# Patient Record
Sex: MALE | Race: WHITE | Employment: FULL TIME | URBAN - METROPOLITAN AREA
[De-identification: names, ages, dates, MRNs, and addresses within clinical notes are randomized per-mention and may not be internally consistent; named-entity substitution may affect disease eponyms.]

---

## 2017-03-14 ENCOUNTER — TELEPHONE (OUTPATIENT)
Dept: ALLERGY | Facility: CLINIC | Age: 22
End: 2017-03-14

## 2017-03-14 NOTE — TELEPHONE ENCOUNTER
Spoke with pt and told him due to the fact that he is 25years old that we need him to sign a consent so that we can discuss health issues with mother. He stated understanding and he also gave verbal permission to discuss his health record and care.   Pt ha

## 2017-05-17 ENCOUNTER — NURSE ONLY (OUTPATIENT)
Dept: ALLERGY | Facility: CLINIC | Age: 22
End: 2017-05-17

## 2017-05-17 ENCOUNTER — OFFICE VISIT (OUTPATIENT)
Dept: ALLERGY | Facility: CLINIC | Age: 22
End: 2017-05-17

## 2017-05-17 VITALS
HEIGHT: 72 IN | RESPIRATION RATE: 16 BRPM | BODY MASS INDEX: 23.7 KG/M2 | OXYGEN SATURATION: 98 % | DIASTOLIC BLOOD PRESSURE: 70 MMHG | SYSTOLIC BLOOD PRESSURE: 108 MMHG | WEIGHT: 175 LBS | TEMPERATURE: 98 F | HEART RATE: 80 BPM

## 2017-05-17 DIAGNOSIS — J30.89 ENVIRONMENTAL AND SEASONAL ALLERGIES: Primary | ICD-10-CM

## 2017-05-17 DIAGNOSIS — Z91.09 ENVIRONMENTAL ALLERGIES: ICD-10-CM

## 2017-05-17 DIAGNOSIS — J30.81 ALLERGIC RHINITIS DUE TO ANIMAL HAIR AND DANDER: Primary | ICD-10-CM

## 2017-05-17 PROCEDURE — 95004 PERQ TESTS W/ALRGNC XTRCS: CPT | Performed by: ALLERGY & IMMUNOLOGY

## 2017-05-17 PROCEDURE — 99204 OFFICE O/P NEW MOD 45 MIN: CPT | Performed by: ALLERGY & IMMUNOLOGY

## 2017-05-17 PROCEDURE — 99212 OFFICE O/P EST SF 10 MIN: CPT | Performed by: ALLERGY & IMMUNOLOGY

## 2017-05-17 RX ORDER — MOMETASONE 50 UG/1
2 SPRAY, METERED NASAL DAILY
Qty: 1 INHALER | Refills: 2 | Status: SHIPPED | OUTPATIENT
Start: 2017-05-17 | End: 2021-06-29

## 2017-05-17 RX ORDER — LEVOCETIRIZINE DIHYDROCHLORIDE 5 MG/1
5 TABLET, FILM COATED ORAL NIGHTLY
Qty: 30 TABLET | Refills: 2 | Status: SHIPPED | OUTPATIENT
Start: 2017-05-17

## 2017-05-17 NOTE — PROGRESS NOTES
Harvin Dandy is a 25year old male. HPI:   No chief complaint on file.     Patient is a 70-year-old male who presents for allergy evaluation with a chief complaint of allergies including concern for possible cat allergy      Allergies  Duration:4 yea Oral Tab Take 150 mcg by mouth before breakfast. Disp:  Rfl: 0   Adapalene-Benzoyl Peroxide (EPIDUO) 0.1-2.5 % Apply Externally Gel Apply 1 Application topically nightly.  Use as tolerated- may bleach clothing Disp: 45 g Rfl: 3   Clindamycin Phosphate (JOSSELIN Cardiovascular: regular rate and rhythm no murmurs, gallups, or rubs  Abdomen: soft non-tender non-distended  Skin/Hair: no unusual rashes present  Extremities: no edema, cyanosis, or clubbing  Neurological:Oriented to time, place, person & situation summer             Orders This Visit:  No orders of the defined types were placed in this encounter.        Meds This Visit:    No prescriptions requested or ordered in this encounter    Imaging & Referrals:  None     5/17/2017  Martin Pugh MD      If

## 2017-05-17 NOTE — PATIENT INSTRUCTIONS
Recs: Handouts on allergies and avoidance measures provided and reviewed including the potential treatment option of immunotherapy  Start Xyzal, levo cetirizine 5 mg p.o. Nightly in place of Zyrtec  Start Nasonex 2 sprays per nostril once a day.   Reviewed

## 2017-11-09 ENCOUNTER — MED REC SCAN ONLY (OUTPATIENT)
Dept: INTERNAL MEDICINE CLINIC | Facility: CLINIC | Age: 22
End: 2017-11-09

## 2018-11-27 ENCOUNTER — MED REC SCAN ONLY (OUTPATIENT)
Dept: PEDIATRICS CLINIC | Facility: CLINIC | Age: 23
End: 2018-11-27

## 2020-06-19 ENCOUNTER — LAB ENCOUNTER (OUTPATIENT)
Dept: LAB | Facility: HOSPITAL | Age: 25
End: 2020-06-19
Attending: INTERNAL MEDICINE
Payer: COMMERCIAL

## 2020-06-19 DIAGNOSIS — Z00.00 ROUTINE GENERAL MEDICAL EXAMINATION AT A HEALTH CARE FACILITY: Primary | ICD-10-CM

## 2020-06-19 PROCEDURE — 36415 COLL VENOUS BLD VENIPUNCTURE: CPT

## 2020-06-19 PROCEDURE — 80061 LIPID PANEL: CPT

## 2020-06-19 PROCEDURE — 81001 URINALYSIS AUTO W/SCOPE: CPT

## 2020-06-19 PROCEDURE — 80053 COMPREHEN METABOLIC PANEL: CPT

## 2020-06-19 PROCEDURE — 85025 COMPLETE CBC W/AUTO DIFF WBC: CPT

## 2020-06-19 PROCEDURE — 84443 ASSAY THYROID STIM HORMONE: CPT

## 2020-07-28 ENCOUNTER — HOSPITAL ENCOUNTER (EMERGENCY)
Facility: HOSPITAL | Age: 25
Discharge: HOME | End: 2020-07-28
Attending: EMERGENCY MEDICINE
Payer: COMMERCIAL

## 2020-07-28 VITALS
HEIGHT: 73 IN | OXYGEN SATURATION: 99 % | RESPIRATION RATE: 18 BRPM | DIASTOLIC BLOOD PRESSURE: 80 MMHG | HEART RATE: 74 BPM | WEIGHT: 160 LBS | TEMPERATURE: 98 F | SYSTOLIC BLOOD PRESSURE: 132 MMHG | BODY MASS INDEX: 21.2 KG/M2

## 2020-07-28 DIAGNOSIS — R00.2 PALPITATIONS: Primary | ICD-10-CM

## 2020-07-28 DIAGNOSIS — E87.6 HYPOKALEMIA: ICD-10-CM

## 2020-07-28 LAB
ANION GAP SERPL CALC-SCNC: 6 MMOL/L (ref 0–18)
BASOPHILS # BLD AUTO: 0.03 X10(3) UL (ref 0–0.2)
BASOPHILS NFR BLD AUTO: 0.4 %
BUN BLD-MCNC: 13 MG/DL (ref 7–18)
BUN/CREAT SERPL: 12.6 (ref 10–20)
CALCIUM BLD-MCNC: 8.6 MG/DL (ref 8.5–10.1)
CHLORIDE SERPL-SCNC: 106 MMOL/L (ref 98–112)
CO2 SERPL-SCNC: 26 MMOL/L (ref 21–32)
CREAT BLD-MCNC: 1.03 MG/DL (ref 0.7–1.3)
DEPRECATED RDW RBC AUTO: 42.1 FL (ref 35.1–46.3)
EOSINOPHIL # BLD AUTO: 0.16 X10(3) UL (ref 0–0.7)
EOSINOPHIL NFR BLD AUTO: 1.9 %
ERYTHROCYTE [DISTWIDTH] IN BLOOD BY AUTOMATED COUNT: 12.8 % (ref 11–15)
GLUCOSE BLD-MCNC: 121 MG/DL (ref 70–99)
HAV IGM SER QL: 2.1 MG/DL (ref 1.6–2.6)
HCT VFR BLD AUTO: 42.9 % (ref 39–53)
HGB BLD-MCNC: 14.7 G/DL (ref 13–17.5)
IMM GRANULOCYTES # BLD AUTO: 0.03 X10(3) UL (ref 0–1)
IMM GRANULOCYTES NFR BLD: 0.4 %
LYMPHOCYTES # BLD AUTO: 1.88 X10(3) UL (ref 1–4)
LYMPHOCYTES NFR BLD AUTO: 22.5 %
MCH RBC QN AUTO: 30.6 PG (ref 26–34)
MCHC RBC AUTO-ENTMCNC: 34.3 G/DL (ref 31–37)
MCV RBC AUTO: 89.2 FL (ref 80–100)
MONOCYTES # BLD AUTO: 0.62 X10(3) UL (ref 0.1–1)
MONOCYTES NFR BLD AUTO: 7.4 %
NEUTROPHILS # BLD AUTO: 5.63 X10 (3) UL (ref 1.5–7.7)
NEUTROPHILS # BLD AUTO: 5.63 X10(3) UL (ref 1.5–7.7)
NEUTROPHILS NFR BLD AUTO: 67.4 %
OSMOLALITY SERPL CALC.SUM OF ELEC: 287 MOSM/KG (ref 275–295)
PLATELET # BLD AUTO: 249 10(3)UL (ref 150–450)
POTASSIUM SERPL-SCNC: 3.2 MMOL/L (ref 3.5–5.1)
RBC # BLD AUTO: 4.81 X10(6)UL (ref 4.3–5.7)
SODIUM SERPL-SCNC: 138 MMOL/L (ref 136–145)
T3FREE SERPL-MCNC: 2.96 PG/ML (ref 2.4–4.2)
T4 FREE SERPL-MCNC: 1.4 NG/DL (ref 0.8–1.7)
TSI SER-ACNC: 0.02 MIU/ML (ref 0.36–3.74)
WBC # BLD AUTO: 8.4 X10(3) UL (ref 4–11)

## 2020-07-28 PROCEDURE — 84443 ASSAY THYROID STIM HORMONE: CPT | Performed by: EMERGENCY MEDICINE

## 2020-07-28 PROCEDURE — 96361 HYDRATE IV INFUSION ADD-ON: CPT

## 2020-07-28 PROCEDURE — 84481 FREE ASSAY (FT-3): CPT | Performed by: EMERGENCY MEDICINE

## 2020-07-28 PROCEDURE — 85025 COMPLETE CBC W/AUTO DIFF WBC: CPT | Performed by: EMERGENCY MEDICINE

## 2020-07-28 PROCEDURE — 83735 ASSAY OF MAGNESIUM: CPT | Performed by: EMERGENCY MEDICINE

## 2020-07-28 PROCEDURE — 96360 HYDRATION IV INFUSION INIT: CPT

## 2020-07-28 PROCEDURE — 93005 ELECTROCARDIOGRAM TRACING: CPT

## 2020-07-28 PROCEDURE — 84439 ASSAY OF FREE THYROXINE: CPT | Performed by: EMERGENCY MEDICINE

## 2020-07-28 PROCEDURE — 99284 EMERGENCY DEPT VISIT MOD MDM: CPT

## 2020-07-28 PROCEDURE — 93010 ELECTROCARDIOGRAM REPORT: CPT | Performed by: EMERGENCY MEDICINE

## 2020-07-28 PROCEDURE — 80048 BASIC METABOLIC PNL TOTAL CA: CPT | Performed by: EMERGENCY MEDICINE

## 2020-07-28 RX ORDER — POTASSIUM CHLORIDE 20 MEQ/1
40 TABLET, EXTENDED RELEASE ORAL ONCE
Status: COMPLETED | OUTPATIENT
Start: 2020-07-28 | End: 2020-07-28

## 2020-07-29 NOTE — ED INITIAL ASSESSMENT (HPI)
Patient presents \"feeling like his heart is racing. \" Patient states tingling in bilateral upper extremities as well.

## 2020-07-29 NOTE — ED PROVIDER NOTES
Patient Seen in: [facility] AND [facility] Emergency Department      History   Patient presents with:  Arrythmia/Palpitations    Stated Complaint:     HPI    77-year-old male presents for palpitations.   Patient reports that shortly after dinner he felt his heart ED Triage Vitals [07/28/20 2028]   /81   Pulse 106   Resp 16   Temp 98 °F (36.7 °C)   Temp src Oral   SpO2 99 %   O2 Device None (Room air)       Current:/80   Pulse 74   Temp 98 °F (36.7 °C) (Oral)   Resp 18   Ht 185.4 cm (6' 1\")   Wt 72. Glucose 121 (*)     Potassium 3.2 (*)     All other components within normal limits   TSH W REFLEX TO FREE T4 - Abnormal; Notable for the following components:    TSH 0.024 (*)     All other components within normal limits   MAGNESIUM - Normal   T4, FREE ( advised to follow up with PCP for reevaluation. Return precautions were given. Patient voices understanding and agreement with the treatment plan. All questions were addressed and answered.                         Disposition and Plan     Clinical Impressio

## 2020-07-31 NOTE — ED INITIAL ASSESSMENT (HPI)
Seen in the ED 3 days ago for same symptoms. States he is having trouble controlling his anxiety. Lack of appetite, upset stomach, not sleeping. Appears anxious with rapid speech and fidgets with mask. Started seeing a therapist about a month ago.  Tried us

## 2020-07-31 NOTE — ED PROVIDER NOTES
Patient Seen in: [facility]      History   Patient presents with: Anxiety    Stated Complaint: Anxiety    HPI    21 yo male with PMH as listed below here for evaluation of anxiety.   History is provided by patient and pt' Ear: External ear normal.      Nose: Nose normal.      Mouth/Throat:      Mouth: Mucous membranes are moist.   Eyes:      Extraocular Movements: Extraocular movements intact. Pupils: Pupils are equal, round, and reactive to light.    Neck:      Musculo daily as needed for Anxiety. , Print, Disp-14 tablet, R-0

## 2020-11-17 ENCOUNTER — LAB ENCOUNTER (OUTPATIENT)
Dept: LAB | Facility: HOSPITAL | Age: 25
End: 2020-11-17
Attending: INTERNAL MEDICINE
Payer: COMMERCIAL

## 2020-11-17 DIAGNOSIS — E89.0 POSTSURGICAL HYPOTHYROIDISM: Primary | ICD-10-CM

## 2020-11-17 PROCEDURE — 84443 ASSAY THYROID STIM HORMONE: CPT

## 2020-11-17 PROCEDURE — 36415 COLL VENOUS BLD VENIPUNCTURE: CPT

## 2020-11-17 PROCEDURE — 84439 ASSAY OF FREE THYROXINE: CPT

## 2021-02-12 ENCOUNTER — LAB ENCOUNTER (OUTPATIENT)
Dept: LAB | Facility: HOSPITAL | Age: 26
End: 2021-02-12
Attending: INTERNAL MEDICINE
Payer: COMMERCIAL

## 2021-02-12 DIAGNOSIS — E03.9 HYPOTHYROIDISM: Primary | ICD-10-CM

## 2021-02-12 DIAGNOSIS — E89.0 POSTSURGICAL HYPOTHYROIDISM: ICD-10-CM

## 2021-02-12 LAB
T4 FREE SERPL-MCNC: 1 NG/DL (ref 0.8–1.7)
TSI SER-ACNC: 1.07 MIU/ML (ref 0.36–3.74)

## 2021-02-12 PROCEDURE — 84443 ASSAY THYROID STIM HORMONE: CPT

## 2021-02-12 PROCEDURE — 36415 COLL VENOUS BLD VENIPUNCTURE: CPT

## 2021-02-12 PROCEDURE — 84439 ASSAY OF FREE THYROXINE: CPT

## 2021-06-29 ENCOUNTER — APPOINTMENT (OUTPATIENT)
Dept: MRI IMAGING | Facility: HOSPITAL | Age: 26
DRG: 684 | End: 2021-06-29
Attending: Other
Payer: COMMERCIAL

## 2021-06-29 ENCOUNTER — APPOINTMENT (OUTPATIENT)
Dept: CT IMAGING | Facility: HOSPITAL | Age: 26
DRG: 684 | End: 2021-06-29
Attending: EMERGENCY MEDICINE
Payer: COMMERCIAL

## 2021-06-29 ENCOUNTER — HOSPITAL ENCOUNTER (INPATIENT)
Facility: HOSPITAL | Age: 26
LOS: 1 days | Discharge: HOME | DRG: 684 | End: 2021-06-30
Attending: EMERGENCY MEDICINE | Admitting: EMERGENCY MEDICINE
Payer: COMMERCIAL

## 2021-06-29 DIAGNOSIS — R56.9 SEIZURE (HCC): Primary | ICD-10-CM

## 2021-06-29 PROBLEM — N17.9 AKI (ACUTE KIDNEY INJURY): Status: ACTIVE | Noted: 2021-06-29

## 2021-06-29 PROBLEM — N17.9 AKI (ACUTE KIDNEY INJURY) (HCC): Status: ACTIVE | Noted: 2021-06-29

## 2021-06-29 LAB
ALBUMIN SERPL-MCNC: 4.8 G/DL (ref 3.4–5)
ALP LIVER SERPL-CCNC: 64 U/L
ALT SERPL-CCNC: 34 U/L
ANION GAP SERPL CALC-SCNC: 25 MMOL/L (ref 0–18)
AST SERPL-CCNC: 25 U/L (ref 15–37)
BASOPHILS # BLD AUTO: 0.09 X10(3) UL (ref 0–0.2)
BASOPHILS NFR BLD AUTO: 0.9 %
BILIRUB DIRECT SERPL-MCNC: 0.2 MG/DL (ref 0–0.2)
BILIRUB SERPL-MCNC: 1 MG/DL (ref 0.1–2)
BUN BLD-MCNC: 13 MG/DL (ref 7–18)
BUN/CREAT SERPL: 9.6 (ref 10–20)
CALCIUM BLD-MCNC: 9.3 MG/DL (ref 8.5–10.1)
CHLORIDE SERPL-SCNC: 102 MMOL/L (ref 98–112)
CO2 SERPL-SCNC: 11 MMOL/L (ref 21–32)
CREAT BLD-MCNC: 1.36 MG/DL
DEPRECATED RDW RBC AUTO: 46.2 FL (ref 35.1–46.3)
EOSINOPHIL # BLD AUTO: 0.29 X10(3) UL (ref 0–0.7)
EOSINOPHIL NFR BLD AUTO: 3 %
ERYTHROCYTE [DISTWIDTH] IN BLOOD BY AUTOMATED COUNT: 12.8 % (ref 11–15)
GLUCOSE BLD-MCNC: 137 MG/DL (ref 70–99)
HAV IGM SER QL: 2.7 MG/DL (ref 1.6–2.6)
HCT VFR BLD AUTO: 51.1 %
HGB BLD-MCNC: 16.7 G/DL
IMM GRANULOCYTES # BLD AUTO: 0.07 X10(3) UL (ref 0–1)
IMM GRANULOCYTES NFR BLD: 0.7 %
LYMPHOCYTES # BLD AUTO: 3.99 X10(3) UL (ref 1–4)
LYMPHOCYTES NFR BLD AUTO: 41.4 %
M PROTEIN MFR SERPL ELPH: 8.9 G/DL (ref 6.4–8.2)
MCH RBC QN AUTO: 31.9 PG (ref 26–34)
MCHC RBC AUTO-ENTMCNC: 32.7 G/DL (ref 31–37)
MCV RBC AUTO: 97.5 FL
MONOCYTES # BLD AUTO: 0.93 X10(3) UL (ref 0.1–1)
MONOCYTES NFR BLD AUTO: 9.7 %
NEUTROPHILS # BLD AUTO: 4.26 X10 (3) UL (ref 1.5–7.7)
NEUTROPHILS # BLD AUTO: 4.26 X10(3) UL (ref 1.5–7.7)
NEUTROPHILS NFR BLD AUTO: 44.3 %
OSMOLALITY SERPL CALC.SUM OF ELEC: 288 MOSM/KG (ref 275–295)
PLATELET # BLD AUTO: 316 10(3)UL (ref 150–450)
POTASSIUM SERPL-SCNC: 3.5 MMOL/L (ref 3.5–5.1)
RBC # BLD AUTO: 5.24 X10(6)UL
SARS-COV-2 RNA RESP QL NAA+PROBE: NOT DETECTED
SODIUM SERPL-SCNC: 138 MMOL/L (ref 136–145)
T4 FREE SERPL-MCNC: 0.8 NG/DL (ref 0.8–1.7)
TSI SER-ACNC: 4.27 MIU/ML (ref 0.36–3.74)
VIT B12 SERPL-MCNC: 336 PG/ML (ref 193–986)
WBC # BLD AUTO: 9.6 X10(3) UL (ref 4–11)

## 2021-06-29 PROCEDURE — 99223 1ST HOSP IP/OBS HIGH 75: CPT | Performed by: INTERNAL MEDICINE

## 2021-06-29 PROCEDURE — 72125 CT NECK SPINE W/O DYE: CPT | Performed by: EMERGENCY MEDICINE

## 2021-06-29 PROCEDURE — 70450 CT HEAD/BRAIN W/O DYE: CPT | Performed by: EMERGENCY MEDICINE

## 2021-06-29 PROCEDURE — 99223 1ST HOSP IP/OBS HIGH 75: CPT | Performed by: OTHER

## 2021-06-29 PROCEDURE — 99254 IP/OBS CNSLTJ NEW/EST MOD 60: CPT | Performed by: PHYSICIAN ASSISTANT

## 2021-06-29 PROCEDURE — 70553 MRI BRAIN STEM W/O & W/DYE: CPT | Performed by: OTHER

## 2021-06-29 RX ORDER — ACETAMINOPHEN 325 MG/1
650 TABLET ORAL EVERY 4 HOURS PRN
Status: DISCONTINUED | OUTPATIENT
Start: 2021-06-29 | End: 2021-06-30

## 2021-06-29 RX ORDER — LORAZEPAM 2 MG/ML
2 INJECTION INTRAMUSCULAR ONCE
Status: COMPLETED | OUTPATIENT
Start: 2021-06-29 | End: 2021-06-29

## 2021-06-29 RX ORDER — ESCITALOPRAM OXALATE 10 MG/1
30 TABLET ORAL DAILY
Status: DISCONTINUED | OUTPATIENT
Start: 2021-06-29 | End: 2021-06-30

## 2021-06-29 RX ORDER — HYDROCODONE BITARTRATE AND ACETAMINOPHEN 5; 325 MG/1; MG/1
1 TABLET ORAL EVERY 4 HOURS PRN
Status: DISCONTINUED | OUTPATIENT
Start: 2021-06-29 | End: 2021-06-30

## 2021-06-29 RX ORDER — HYDROCODONE BITARTRATE AND ACETAMINOPHEN 5; 325 MG/1; MG/1
2 TABLET ORAL EVERY 4 HOURS PRN
Status: DISCONTINUED | OUTPATIENT
Start: 2021-06-29 | End: 2021-06-30

## 2021-06-29 RX ORDER — ALPRAZOLAM 0.25 MG/1
0.25 TABLET ORAL 2 TIMES DAILY PRN
Status: DISCONTINUED | OUTPATIENT
Start: 2021-06-29 | End: 2021-06-30

## 2021-06-29 RX ORDER — HEPARIN SODIUM 5000 [USP'U]/ML
5000 INJECTION, SOLUTION INTRAVENOUS; SUBCUTANEOUS EVERY 8 HOURS SCHEDULED
Status: DISCONTINUED | OUTPATIENT
Start: 2021-06-29 | End: 2021-06-30

## 2021-06-29 RX ORDER — SODIUM CHLORIDE, SODIUM LACTATE, POTASSIUM CHLORIDE, CALCIUM CHLORIDE 600; 310; 30; 20 MG/100ML; MG/100ML; MG/100ML; MG/100ML
INJECTION, SOLUTION INTRAVENOUS CONTINUOUS
Status: DISCONTINUED | OUTPATIENT
Start: 2021-06-29 | End: 2021-06-30

## 2021-06-29 RX ORDER — ONDANSETRON 2 MG/ML
4 INJECTION INTRAMUSCULAR; INTRAVENOUS EVERY 6 HOURS PRN
Status: DISCONTINUED | OUTPATIENT
Start: 2021-06-29 | End: 2021-06-30

## 2021-06-29 RX ORDER — METOCLOPRAMIDE HYDROCHLORIDE 5 MG/ML
10 INJECTION INTRAMUSCULAR; INTRAVENOUS EVERY 8 HOURS PRN
Status: DISCONTINUED | OUTPATIENT
Start: 2021-06-29 | End: 2021-06-30

## 2021-06-29 RX ORDER — LEVOTHYROXINE SODIUM 112 UG/1
112 TABLET ORAL
Status: DISCONTINUED | OUTPATIENT
Start: 2021-06-30 | End: 2021-06-30

## 2021-06-29 NOTE — ED PROVIDER NOTES
Patient Seen in: [facility] AND [facility] Emergency Department      History   Patient presents with:  Seizure Disorder    Stated Complaint: seizure    HPI/Subjective:   HPI    51-year-old male with history of thyroid cancer at age 12 presents for evaluation of O2 Device 06/29/21 1253 None (Room air)       Current:/80   Pulse 87   Temp 98.2 °F (36.8 °C) (Oral)   Resp 19   SpO2 97%         Physical Exam  Vitals and nursing note reviewed. Constitutional:       General: He is not in acute distress.      Marisel Total Protein 8.9 (*)     All other components within normal limits   T4, FREE (S) - Normal   CBC WITH DIFFERENTIAL WITH PLATELET    Narrative: The following orders were created for panel order CBC With Differential With Platelet.   Procedure inflammation superficial to the right     temporalis muscle suggesting bruising or shallow hematoma. No acute     calvarial fracture. SINUSES: Trace chronic appearing inflammation within the sphenoid and     maxillary sinuses. No fluid levels.   Ellie Saravia fracture or traumatic subluxation of the cervical spine. 2. Moderate dextroscoliosis of the cervical spine. 3. Mild enlargement of the adenoids as well as palatine/lingual tonsils. 4. Lesser incidental findings as above.               Remote - PS Shauna, no further recommendations for the ER.     Impression:  After review and interpretation of the above emergency department workup, the patient was found to have Seizure [facility])  (primary encounter diagnosis)    Plan: Well-appearing patient, initially t

## 2021-06-29 NOTE — H&P
[facility]  HISTORY AND PHYSICAL       Jonathan Niño Patient Status:  Emergency    1995  32year old [facility] [id number]   Location  Attending Yesy Garrison MD     PCP Maria A Mckeon MD         DATE OF ADMISSION: 21 aggravating factors. States occurs with alcohol, food, and liquid. Denies any blood in emesis. Episodes are usually self resolving. Patient is able to eat regularly in between episodes. Episodes typically happen sporadically every few days or so.   Kai Carrion GEL    Apply 1 Application topically nightly.    Modified Medications    No medications on file   Discontinued Medications    No medications on file       SOCIAL HISTORY  Social History    Socioeconomic History      Marital status: Single      Spouse name: present. MUSCULOSKELETAL:  No obvious abnormalities noted  EXTREMITIES: No edema appreciated  NEUROLOGICAL: AAOx3, motor and sensation grossly intact, no focal deficit appreciated.     SKIN:  Warm and well perfused  PSYCHIATRIC: Normal mood      IMAGING  C further recommendations      TIM   -Likely secondary to seizure activity  -Check CPK  - Starting IVF  - Avoiding Nephrotoxic agents  - Cont to monitor      Hypothyroidism  -Resume home levothyroxine         70 minutes spent on this admission - discussing w

## 2021-06-29 NOTE — CONSULTS
Consult dictated. Patient evaluated in the emergency room, by dad, sister. New onset generalized tonic-clonic seizure. Have ordered an MRI of the brain pre and postcontrast, EEG as soon as possible.   Further recommendation pending results of test.  Long

## 2021-06-29 NOTE — PLAN OF CARE
Patient came from ED, seizure precautions, CIWA protocol. MRI planned for this evening. Will continue to monitor.       Problem: Patient Centered Care  Goal: Patient preferences are identified and integrated in the patient's plan of care  Description: Inter

## 2021-06-29 NOTE — CONSULTS
[facility]   Gastroenterology Consultation Note    Yudith Falcon  Patient Status:    Emergency  Date of Admission:         6/29/2021, Hospital day #0  Attending:   Bj Hall MD  PCP:     Dariela Betancourt MD    Reason for Con occasionally with spicy foods; no BE in the family he is aware of     Surgical Hx:  - thyroidectomy  - no abdominal surgeries     Endoscopy Hx:  - none    Social Hx:  - no tobacco use  + recent increase ETOH 2-3 alcoholic beverages nightly   - Denies illic ALLERGIC/IMMUNOLOGIC:  negative for hay fever   ENDOCRINE:  negative for cold intolerance and heat intolerance  MUSCULOSKELETAL:  negative for joint effusion/severe erythema  NEURO: upper and lower extremity shaking lasting approximately 2 minutes   BEHA of the cervical spine. 2. Moderate dextroscoliosis of the cervical spine. 3. Mild enlargement of the adenoids as well as palatine/lingual tonsils. 4. Lesser incidental findings as above.    Remote - PS  Dictated by (CST): Jaci Qiu MD on 6/29/2021 at bedside. Thank you for the opportunity to participate in the care of this patient.     [id number] [facility]  6/29/2021

## 2021-06-30 VITALS
DIASTOLIC BLOOD PRESSURE: 63 MMHG | OXYGEN SATURATION: 95 % | WEIGHT: 138.88 LBS | TEMPERATURE: 99 F | RESPIRATION RATE: 18 BRPM | SYSTOLIC BLOOD PRESSURE: 117 MMHG | HEART RATE: 51 BPM | BODY MASS INDEX: 18 KG/M2

## 2021-06-30 LAB
ALBUMIN SERPL-MCNC: 4 G/DL (ref 3.4–5)
ALBUMIN/GLOB SERPL: 1.2 {RATIO} (ref 1–2)
ALP LIVER SERPL-CCNC: 49 U/L
ALT SERPL-CCNC: 29 U/L
ANION GAP SERPL CALC-SCNC: 7 MMOL/L (ref 0–18)
AST SERPL-CCNC: 37 U/L (ref 15–37)
BASOPHILS # BLD AUTO: 0.03 X10(3) UL (ref 0–0.2)
BASOPHILS NFR BLD AUTO: 0.4 %
BILIRUB SERPL-MCNC: 1.7 MG/DL (ref 0.1–2)
BUN BLD-MCNC: 8 MG/DL (ref 7–18)
BUN/CREAT SERPL: 7.8 (ref 10–20)
CALCIUM BLD-MCNC: 8.9 MG/DL (ref 8.5–10.1)
CHLORIDE SERPL-SCNC: 105 MMOL/L (ref 98–112)
CO2 SERPL-SCNC: 28 MMOL/L (ref 21–32)
CREAT BLD-MCNC: 1.03 MG/DL
DEPRECATED RDW RBC AUTO: 47.3 FL (ref 35.1–46.3)
EOSINOPHIL # BLD AUTO: 0.21 X10(3) UL (ref 0–0.7)
EOSINOPHIL NFR BLD AUTO: 2.6 %
ERYTHROCYTE [DISTWIDTH] IN BLOOD BY AUTOMATED COUNT: 13.3 % (ref 11–15)
GLOBULIN PLAS-MCNC: 3.4 G/DL (ref 2.8–4.4)
GLUCOSE BLD-MCNC: 95 MG/DL (ref 70–99)
HAV IGM SER QL: 2.6 MG/DL (ref 1.6–2.6)
HCT VFR BLD AUTO: 44.8 %
HGB BLD-MCNC: 14.8 G/DL
IMM GRANULOCYTES # BLD AUTO: 0.03 X10(3) UL (ref 0–1)
IMM GRANULOCYTES NFR BLD: 0.4 %
LYMPHOCYTES # BLD AUTO: 1.83 X10(3) UL (ref 1–4)
LYMPHOCYTES NFR BLD AUTO: 23 %
M PROTEIN MFR SERPL ELPH: 7.4 G/DL (ref 6.4–8.2)
MCH RBC QN AUTO: 31.6 PG (ref 26–34)
MCHC RBC AUTO-ENTMCNC: 33 G/DL (ref 31–37)
MCV RBC AUTO: 95.7 FL
MONOCYTES # BLD AUTO: 0.73 X10(3) UL (ref 0.1–1)
MONOCYTES NFR BLD AUTO: 9.2 %
NEUTROPHILS # BLD AUTO: 5.11 X10 (3) UL (ref 1.5–7.7)
NEUTROPHILS # BLD AUTO: 5.11 X10(3) UL (ref 1.5–7.7)
NEUTROPHILS NFR BLD AUTO: 64.4 %
OSMOLALITY SERPL CALC.SUM OF ELEC: 288 MOSM/KG (ref 275–295)
PLATELET # BLD AUTO: 234 10(3)UL (ref 150–450)
POTASSIUM SERPL-SCNC: 4.1 MMOL/L (ref 3.5–5.1)
RBC # BLD AUTO: 4.68 X10(6)UL
SODIUM SERPL-SCNC: 140 MMOL/L (ref 136–145)
WBC # BLD AUTO: 7.9 X10(3) UL (ref 4–11)

## 2021-06-30 PROCEDURE — 99232 SBSQ HOSP IP/OBS MODERATE 35: CPT | Performed by: OTHER

## 2021-06-30 PROCEDURE — 95816 EEG AWAKE AND DROWSY: CPT | Performed by: OTHER

## 2021-06-30 PROCEDURE — 99233 SBSQ HOSP IP/OBS HIGH 50: CPT | Performed by: HOSPITALIST

## 2021-06-30 PROCEDURE — 99233 SBSQ HOSP IP/OBS HIGH 50: CPT | Performed by: INTERNAL MEDICINE

## 2021-06-30 RX ORDER — PANTOPRAZOLE SODIUM 40 MG/1
40 TABLET, DELAYED RELEASE ORAL DAILY
Qty: 30 TABLET | Refills: 0 | Status: SHIPPED | OUTPATIENT
Start: 2021-06-30

## 2021-06-30 NOTE — PROGRESS NOTES
Pt was admitted to the hospital subsequent to falling on passing out suddenly , hitting his head on the floor. According to the pt there is no history in the family of seizure and this is the first occurrence he has encountered. Pt did acknowledge that the i

## 2021-06-30 NOTE — PLAN OF CARE
Dr Edinson Rob and Dr. Charanjit Brush have cleared the patient for discharge home. Dr. Charanjit Brush advised the patient to avoid driving for a minimum of 3 months. Mom and dad are at the bedside. Patient's parents will drive him home.

## 2021-06-30 NOTE — CONSULTS
[facility]    PATIENT'S NAME: Pegge Stager   ATTENDING PHYSICIAN: Freda Khanna MD   CONSULTING PHYSICIAN: Talisha Stuart MD   PATIENT ACCOUNT#:   [id number]    LOCATION:  [address] [place] [address] RECORD #:   [id number]       [facility] reflexes are symmetric, without Babinski signs. Joint position sense and vibration normal.  Finger-to-nose is normal.    LABORATORY DATA:  He had a CT of the head, cervical spine; reports reviewed. IMPRESSION:  New onset seizure.   I called the EEG tech

## 2021-06-30 NOTE — PROGRESS NOTES
Discharge RN Summary: Patient has discharge order in. Patient to discharge home. IV removed by RN He. Understands to follow up with PCP in 1 week. Patient understands to  meds from pharmacy.        Scripts sent with pt: none  Electronically se

## 2021-06-30 NOTE — PROGRESS NOTES
Awake–drowsy EEG is normal.  MRI has been completed several hours ago.   Still waiting for report from radiologist.

## 2021-06-30 NOTE — PROGRESS NOTES
[facility]     Gastroenterology Progress Note    Adine Gross Patient Status:  Inpatient    1995 MRN [id number]   Location [address] Attending Alea Menjivar, [address] Day # 1 PCP Micheline Gardner MD       Tally Pour few weeks later. This has been ongoing for a while and appears that he saw our group in clinic in 2014 for similar symptoms, at that point celiac serologies and inflammatory markers were negative. On this admission CBC and CMP are largely unremarkable. Gastroenterology      Results:     Lab Results   Component Value Date    WBC 7.9 06/30/2021    HGB 14.8 06/30/2021    HCT 44.8 06/30/2021    .0 06/30/2021    CREATSERUM 1.03 06/30/2021    BUN 8 06/30/2021     06/30/2021    K 4.1 06/30/2021 Sinus Tachycardia -RSR(V1).  -Left atrial enlargement.  BORDERLINE When compared with ECG of 07/28/2020 20:23:39 No significant changes have occurred Electronically signed on 06/29/2021 at 16:07 by Evens Lux MD

## 2021-06-30 NOTE — PROCEDURES
[address] [facility] [address]      PATIENT'S NAME: Roberto Reynolds   ATTENDING PHYSICIAN: Arin Lawler MD   PATIENT ACCOUNT #: [de-identified] LOCATION: [facility]   MEDICAL RECORD #: [id number] DATE OF BIR

## 2021-07-01 LAB — H PYLORI AG STL QL IA: NEGATIVE

## 2021-07-01 NOTE — PROGRESS NOTES
[facility]    Progress Note    Montana Zach Patient Status:  Inpatient    1995 MRN [id number]   Location [address] [facility] Attending No att. providers found   [facility] Day # 1 PCP Nicholas France MD       Subjective:   Deann Posadas We 2.6 06/30/2021    B12 336 06/29/2021       CT BRAIN OR HEAD (28356)    Result Date: 6/29/2021  CONCLUSION: There is localized bruising or a shallow hematoma over the right temporal scalp. No acute calvarial fracture. No acute intracranial process.     Dic

## 2021-07-02 LAB — TTG IGA SER-ACNC: 0.5 U/ML (ref ?–7)

## 2021-07-03 LAB — CALPROTECTIN STL-MCNT: 35.3 ΜG/G (ref ?–50)

## 2021-07-06 NOTE — DISCHARGE SUMMARY
[facility]    Discharge Summary    Bethel Rosales Patient Status:  Inpatient    1995 MRN [id number]   Location [address] [facility] Attending No att. providers found   Hosp Day # 1 PCP Luúl Spears MD     Date of Admission:  patient fell, hitting forehead on floor, and continued to have what was described as tonic-clonic activity. Father states son was unresponsive at that time. Episode reportedly lasted about 1 to 2 minutes and was self resolved.   After episode, patient rem bites  -Some possible concern for alcohol/benzodiazepine withdrawal induced seizure activity.   However patient denies any recent sudden cessation.   -seizure precautions  -Counseled on need for cessation of any nonprescription drugs  -Close monitoring  -Ne mouth 2 (two) times daily as needed for Anxiety. Quantity: 30 tablet  Refills: 1     Levocetirizine Dihydrochloride 5 MG Tabs  Commonly known as: Xyzal      Take 1 tablet (5 mg total) by mouth nightly.    Quantity: 30 tablet  Refills: 2     Levothyroxine

## 2021-09-13 ENCOUNTER — OFFICE VISIT (OUTPATIENT)
Dept: GASTROENTEROLOGY | Facility: CLINIC | Age: 26
End: 2021-09-13
Payer: COMMERCIAL

## 2021-09-13 VITALS
WEIGHT: 185.38 LBS | BODY MASS INDEX: 24.57 KG/M2 | HEIGHT: 73 IN | TEMPERATURE: 99 F | DIASTOLIC BLOOD PRESSURE: 81 MMHG | SYSTOLIC BLOOD PRESSURE: 133 MMHG | HEART RATE: 84 BPM

## 2021-09-13 DIAGNOSIS — R11.2 NAUSEA AND VOMITING IN ADULT: Primary | ICD-10-CM

## 2021-09-13 PROCEDURE — 3008F BODY MASS INDEX DOCD: CPT | Performed by: INTERNAL MEDICINE

## 2021-09-13 PROCEDURE — 3079F DIAST BP 80-89 MM HG: CPT | Performed by: INTERNAL MEDICINE

## 2021-09-13 PROCEDURE — 3075F SYST BP GE 130 - 139MM HG: CPT | Performed by: INTERNAL MEDICINE

## 2021-09-13 PROCEDURE — 99213 OFFICE O/P EST LOW 20 MIN: CPT | Performed by: INTERNAL MEDICINE

## 2021-09-13 RX ORDER — ONDANSETRON 4 MG/1
4 TABLET, ORALLY DISINTEGRATING ORAL EVERY 8 HOURS PRN
Qty: 30 TABLET | Refills: 1 | Status: SHIPPED | OUTPATIENT
Start: 2021-09-13 | End: 2022-01-14

## 2021-09-13 NOTE — PROGRESS NOTES
Subjective:   Patient ID: Brown Rodriguez is a 32year old male. HPI  Adolfo Harley returns in follow-up. He was last seen by myself in August 2014.   He was recently hospitalized in June 2021 for a seizure and had nausea and vomiting in the setting of alcoh does not currently use the cannabis although did so in the past.    He is considering reevaluation through [facility].       History/Other:   Review of Systems  Current Outpatient Medications   Medication Sig Dispense Refill   • ondansetron 4 MG Oral Tabl Tenderness: There is no abdominal tenderness. There is no guarding or rebound. Musculoskeletal:      Cervical back: Neck supple. Lymphadenopathy:      Cervical: No cervical adenopathy.    Neurological:      Mental Status: He is alert and oriented to per PHOSPHATASE      45 - 117 U/L 49 64   Total Bilirubin      0.1 - 2.0 mg/dL 1.7 1.0   TOTAL PROTEIN      6.4 - 8.2 g/dL 7.4 8.9 (H)   Albumin      3.4 - 5.0 g/dL 4.0 4.8   Globulin      2.8 - 4.4 g/dL 3.4    A/G Ratio      1.0 - 2.0 1.2    Bilirubin, Direct other significant visible lesion.     SINUSES: Limited views demonstrate trace mucosal thickening of the maxillary sinuses with minimal mucous retention cyst formation on the left.     ORBITS: Limited views are grossly unremarkable.         OTHER: There is

## 2021-09-14 NOTE — PATIENT INSTRUCTIONS
1.  Continue efforts to treat and control anxiety. 2.  Ondansetron ODT (Zofran) as needed for the nausea. 3.  Please contact me if your symptoms continue.

## 2021-11-10 ENCOUNTER — TELEPHONE (OUTPATIENT)
Dept: NEUROLOGY | Facility: CLINIC | Age: 26
End: 2021-11-10

## 2021-11-10 DIAGNOSIS — R56.9 SEIZURE (HCC): Primary | ICD-10-CM

## 2021-11-10 RX ORDER — LEVETIRACETAM 500 MG/1
TABLET ORAL
Qty: 180 TABLET | Refills: 3 | Status: SHIPPED | OUTPATIENT
Start: 2021-11-10 | End: 2022-01-10

## 2021-11-11 NOTE — TELEPHONE ENCOUNTER
I spoke with Bakari's dad Payton Issa, who stated he came home alone 1 PM today somewhat disoriented and then took a nap. Upon arising from a nap he noticed that he had bitten his tongue and was complaining of low back pain.   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

## 2021-11-19 ENCOUNTER — LAB ENCOUNTER (OUTPATIENT)
Dept: LAB | Age: 26
End: 2021-11-19
Attending: Other
Payer: COMMERCIAL

## 2021-11-19 DIAGNOSIS — R56.9 SEIZURE (HCC): ICD-10-CM

## 2021-11-19 PROCEDURE — 80177 DRUG SCRN QUAN LEVETIRACETAM: CPT

## 2021-11-19 PROCEDURE — 36415 COLL VENOUS BLD VENIPUNCTURE: CPT

## 2021-11-22 ENCOUNTER — TELEPHONE (OUTPATIENT)
Dept: NEUROLOGY | Facility: CLINIC | Age: 26
End: 2021-11-22

## 2021-11-22 DIAGNOSIS — R56.9 SEIZURE (HCC): Primary | ICD-10-CM

## 2021-11-22 NOTE — TELEPHONE ENCOUNTER
Please call the patient and tell him Keppra level is low. If he is presently on 500 mg p.o. twice daily. Tell him to increase to 1000 mg p.o. twice daily. Tell him to check a trough Keppra level in 1 week.

## 2021-11-22 NOTE — TELEPHONE ENCOUNTER
Spoke to patient and let him know below information, he states he did not take his keppra the morning of his blood draw and if that has anything to do with the low level.      Pt states that he has been feeling some side effects from the keppra but he feels

## 2021-11-22 NOTE — TELEPHONE ENCOUNTER
He can tell him that it would not significantly affect the level. If he is significant more side effects from the increased dose and they persist, tell him to call office and we will switch him to normal medication.

## 2021-11-23 NOTE — TELEPHONE ENCOUNTER
I spoke with Criss Muñoz and originally was interested in trying another anticonvulsant medication because of some issues with depression. No suicidal ideations or thoughts.   I did relate that if we switch him to another medication he would need to be on 2 me

## 2021-11-29 ENCOUNTER — LAB ENCOUNTER (OUTPATIENT)
Dept: LAB | Age: 26
End: 2021-11-29
Attending: Other
Payer: COMMERCIAL

## 2021-11-29 DIAGNOSIS — R56.9 SEIZURE (HCC): ICD-10-CM

## 2021-11-29 PROCEDURE — 80177 DRUG SCRN QUAN LEVETIRACETAM: CPT

## 2021-11-29 PROCEDURE — 36415 COLL VENOUS BLD VENIPUNCTURE: CPT

## 2021-12-01 ENCOUNTER — TELEPHONE (OUTPATIENT)
Dept: NEUROLOGY | Facility: CLINIC | Age: 26
End: 2021-12-01

## 2021-12-02 NOTE — TELEPHONE ENCOUNTER
Spoke to pt who reports he was feeling well until he doubled the dose of Keppra on 11/22/21. Since doubling the dose, pt  has been experiencing \"bouts of insomnia, extreme anxiety, & detachment. \"  Pt is currently taking Keppra 1000 mg po BID

## 2021-12-07 ENCOUNTER — TELEPHONE (OUTPATIENT)
Dept: NEUROLOGY | Facility: CLINIC | Age: 26
End: 2021-12-07

## 2021-12-07 DIAGNOSIS — R56.9 SEIZURE (HCC): Primary | ICD-10-CM

## 2021-12-07 NOTE — TELEPHONE ENCOUNTER
Spoke with [employer] He is having side effects from [place]. Keppra 500mg, 2 p.o. twice daily. Will decrease to 1 in the morning 2 at night. We will check a blood level in 1 week.

## 2021-12-15 ENCOUNTER — LAB ENCOUNTER (OUTPATIENT)
Dept: LAB | Age: 26
End: 2021-12-15
Attending: Other
Payer: COMMERCIAL

## 2021-12-15 DIAGNOSIS — R56.9 SEIZURE (HCC): ICD-10-CM

## 2021-12-15 PROCEDURE — 36415 COLL VENOUS BLD VENIPUNCTURE: CPT

## 2021-12-15 PROCEDURE — 80177 DRUG SCRN QUAN LEVETIRACETAM: CPT

## 2021-12-21 ENCOUNTER — TELEPHONE (OUTPATIENT)
Dept: NEUROLOGY | Facility: CLINIC | Age: 26
End: 2021-12-21

## 2021-12-21 ENCOUNTER — TELEMEDICINE (OUTPATIENT)
Dept: NEUROLOGY | Facility: CLINIC | Age: 26
End: 2021-12-21
Payer: COMMERCIAL

## 2021-12-21 DIAGNOSIS — R56.9 SEIZURE (HCC): Primary | ICD-10-CM

## 2021-12-21 PROCEDURE — G2252 BRIEF CHKIN BY MD/QHP, 11-20: HCPCS | Performed by: OTHER

## 2021-12-21 NOTE — TELEPHONE ENCOUNTER
Please call the patient tell him Keppra levels right in the middle range of therapeutic. Please ask him how he is feeling on the [address].

## 2021-12-21 NOTE — PROGRESS NOTES
Virtual/Telephone Check-In    Angela Davila verbally consents to a Virtual/Telephone Check-In service on 12/21/21. Patient understands and accepts financial responsibility for any deductible, co-insurance and/or co-pays associated with this service.

## 2021-12-21 NOTE — TELEPHONE ENCOUNTER
Spoke with pt about below notes. He state he feel ok. Pt state he still have days when he have side effect;severe anxiety and depression. He is now having some insomnia as well. Pt has an appt to day and plan to further discuss.

## 2022-01-07 ENCOUNTER — PATIENT MESSAGE (OUTPATIENT)
Dept: NEUROLOGY | Facility: CLINIC | Age: 27
End: 2022-01-07

## 2022-01-07 DIAGNOSIS — R56.9 SEIZURE (HCC): Primary | ICD-10-CM

## 2022-01-10 RX ORDER — LEVETIRACETAM 500 MG/1
TABLET ORAL
Qty: 270 TABLET | Refills: 3 | Status: SHIPPED | OUTPATIENT
Start: 2022-01-10

## 2022-01-10 RX ORDER — LEVETIRACETAM 500 MG/1
TABLET ORAL
Qty: 270 TABLET | Refills: 3 | Status: CANCELLED | OUTPATIENT
Start: 2022-01-10

## 2022-01-10 NOTE — TELEPHONE ENCOUNTER
From: Roger Downing  To: Gilford Quince, MD, MD  Sent: 1/7/2022 3:36 PM CST  Subject: Elvira Alanis,    I have 9 pills of Keppra left and attempted to get a refill as I am currently taking 3 a day.  [facility] says they are unable to fill t

## 2022-01-10 NOTE — TELEPHONE ENCOUNTER
Patient called to say that [facility] is saying   Prescription can not be filled until 1/17 but he will not have enough to last till then. Are we able to do anything to get it refilled sooner?

## 2022-01-10 NOTE — TELEPHONE ENCOUNTER
Refill request for keppra 500 mg, take 1 tab in morning and 2 tabs in evening, #270, 3 refills    LOV: 12/21/21  NOV: none

## 2022-01-12 ENCOUNTER — TELEPHONE (OUTPATIENT)
Dept: NEUROLOGY | Facility: CLINIC | Age: 27
End: 2022-01-12

## 2022-01-14 RX ORDER — ONDANSETRON 4 MG/1
4 TABLET, ORALLY DISINTEGRATING ORAL EVERY 8 HOURS PRN
Qty: 30 TABLET | Refills: 1 | Status: SHIPPED | OUTPATIENT
Start: 2022-01-14

## 2022-01-14 NOTE — TELEPHONE ENCOUNTER
Dr. Reese Guzman--    Patient requesting refill for ondansetron 4 MG. Please review and sign pended orders if appropriate.      LOV: 09/13/2021   LR: 09/13/2021   Future Appts: None     Medication Quantity Refills Start End   ondansetron 4 MG Oral Tabl

## 2022-01-31 ENCOUNTER — LAB ENCOUNTER (OUTPATIENT)
Dept: LAB | Age: 27
End: 2022-01-31
Attending: INTERNAL MEDICINE
Payer: COMMERCIAL

## 2022-01-31 DIAGNOSIS — C73 MALIGNANT NEOPLASM OF THYROID GLAND (HCC): Primary | ICD-10-CM

## 2022-01-31 DIAGNOSIS — E89.0 POST-SURGICAL HYPOTHYROIDISM: ICD-10-CM

## 2022-01-31 LAB
T4 FREE SERPL-MCNC: 0.7 NG/DL (ref 0.8–1.7)
TSI SER-ACNC: 13.6 MIU/ML (ref 0.36–3.74)

## 2022-01-31 PROCEDURE — 86800 THYROGLOBULIN ANTIBODY: CPT

## 2022-01-31 PROCEDURE — 36415 COLL VENOUS BLD VENIPUNCTURE: CPT

## 2022-01-31 PROCEDURE — 84443 ASSAY THYROID STIM HORMONE: CPT

## 2022-01-31 PROCEDURE — 84432 ASSAY OF THYROGLOBULIN: CPT

## 2022-01-31 PROCEDURE — 84439 ASSAY OF FREE THYROXINE: CPT

## 2022-02-02 LAB
THYROGLOBULIN AB: <0.9 IU/ML
THYROGLOBULIN, SERUM OR PLASMA: 0.3 NG/ML

## 2022-03-23 ENCOUNTER — TELEPHONE (OUTPATIENT)
Dept: PHYSICAL MEDICINE AND REHAB | Facility: CLINIC | Age: 27
End: 2022-03-23

## 2022-03-23 NOTE — TELEPHONE ENCOUNTER
Please call the patient tell him I am not aware of any articles referencing interaction of melatonin and seizure medicine. Sleep deprivation may trigger seizures. Amount of melatonin he is taking is a very large dose which I would not recommend and asked him to discuss this with his primary care doctor.

## 2022-03-23 NOTE — TELEPHONE ENCOUNTER
Spoke with pt that state he is taking 20 mg of Melatonin at night to help with sleep. He read a article that stated taking Melatonin along with anti-seizure medication can have a negative effect. Pt would like clarity if he should be taking medication together.

## 2022-03-23 NOTE — TELEPHONE ENCOUNTER
Pt has a question regarding -levETIRAcetam (KEPPRA) 500 MG Oral Tab- . Pt had read a article regarding an  medication  he takes that can have negative effect if pt is taking Keppra .   Please advise

## 2022-03-24 RX ORDER — ONDANSETRON 4 MG/1
4 TABLET, ORALLY DISINTEGRATING ORAL EVERY 8 HOURS PRN
Qty: 30 TABLET | Refills: 1 | Status: SHIPPED | OUTPATIENT
Start: 2022-03-24

## 2022-04-12 ENCOUNTER — PATIENT MESSAGE (OUTPATIENT)
Dept: NEUROLOGY | Facility: CLINIC | Age: 27
End: 2022-04-12

## 2022-04-12 NOTE — TELEPHONE ENCOUNTER
Please advise. Pt taking keppra 500mg 1 tablet in the morning and 2 tablets in the evening- no missed doses.

## 2022-04-12 NOTE — TELEPHONE ENCOUNTER
From: Torito Sandoval  To: Bekah Torres MD, MD  Sent: 4/12/2022 11:54 AM CDT  Subject: Neural Misfire    Hi Dr Rambo Watson,  I had another neural misfire last night in front of my dad. I was bent over petting my sister's new cat and got up when my dad claimed I looked like I was seeing stars and asked if I was okay. It wasn't until a half second later that I got a major head rush and felt extremely lightheaded to the point where I needed to sit down. Was shaking pretty substantially due to anxiety. Thank God I didn't seize or pass out, but I'm pretty shaken up about it. Do you have any thoughts or advice? My dad seems to think it was just a byproduct of the blood rushing to my head while petting the cat and getting up too quickly. I just get worried since all these neurological events happen out of nowhere. Please let me know if you want to discuss further or have any ideas. As mentioned, my dad was there too if you need him to answer any questions.    Thanks,   [employer]

## 2022-04-18 ENCOUNTER — PATIENT MESSAGE (OUTPATIENT)
Dept: GASTROENTEROLOGY | Facility: CLINIC | Age: 27
End: 2022-04-18

## 2022-04-18 NOTE — TELEPHONE ENCOUNTER
From: Guerrero Nurse  To: Nabil Wang MD  Sent: 4/18/2022 3:34 PM CDT  Subject: Xanax Refill    Hi Dr. Bushra Faye,  I am traveling to [facility] this Thursday and am taking an inventory on my medications before traveling. I am running quite low on Xanax and all that is left in the bottle is all but dust. I was wondering if we could schedule an appointment before then or you could write me a new script. I haven't been out of country since [place] and am quite anxious pre trip so it'd be nice to know I have the emergency pills just in case. Please let me know what the best course of action is.   Thanks,  [employer]

## 2022-04-27 PROBLEM — F41.9 ANXIETY DISORDER, UNSPECIFIED: Status: ACTIVE | Noted: 2022-04-27

## 2022-05-05 ENCOUNTER — LAB ENCOUNTER (OUTPATIENT)
Dept: LAB | Age: 27
End: 2022-05-05
Attending: Other
Payer: COMMERCIAL

## 2022-05-05 DIAGNOSIS — R56.9 SEIZURE (HCC): ICD-10-CM

## 2022-05-05 PROCEDURE — 36415 COLL VENOUS BLD VENIPUNCTURE: CPT

## 2022-05-05 PROCEDURE — 80177 DRUG SCRN QUAN LEVETIRACETAM: CPT

## 2022-05-07 LAB — LEVETIRACETAM (KEPPRA): 12 UG/ML

## 2022-05-09 ENCOUNTER — TELEPHONE (OUTPATIENT)
Dept: NEUROLOGY | Facility: CLINIC | Age: 27
End: 2022-05-09

## 2022-05-09 NOTE — TELEPHONE ENCOUNTER
Call patient and ask him how he is feeling. Keppra level was therapeutic. Ask him if he has had any seizures. Let me know.

## 2022-06-28 NOTE — TELEPHONE ENCOUNTER
Please call the patient and ask him how he is feeling on the [address]. His Keppra level is now towards the high side of normal.  Please tell him I am still out of the office.   If he is having side effects please let me know and also have them tell you how m Td (Adult), 5 Lf Tetanus Toxoid, Pf (Tenivac, Decavac) 11/20/2021       Active Problems:  Patient Active Problem List   Diagnosis Code    Other and unspecified angina pectoris I20.9    Coronary atherosclerosis of native coronary artery I25.10    Other chronic pulmonary heart diseases I27.89    Mitral valve disorder I05.9    Acute combined systolic and diastolic heart failure ([facility]) I50.41    Chronic low back pain M54.50, G89.29    Essential hypertension I10    AGUILA (acute kidney injury) ([facility] N17.9    Renal insufficiency N28.9    Gout M10.9    Pain in joint, hand M25.549    Mixed hyperlipidemia E78.2    Primary insomnia F51.01    Closed stable burst fracture of first lumbar vertebra ([facility]) S32.011A    GERD (gastroesophageal reflux disease) K21.9    Palpitations R00.2    V-tach ([facility]) I47.2    Abnormal myocardial perfusion study R94.39    Ischemic cardiomyopathy I25.5    Cardiomyopathy ([facility] [facility] I42.9    PVD (posterior vitreous detachment), both eyes H43.813    Posterior subcapsular polar age-related cataract of both eyes H25.043    Confusion R41.0    Chronic kidney disease, stage IV (severe) ([facility]) N18.4    Visual field defect H53.40    Stroke of unknown etiology ([facility] [address] I63.9    Encounter for loop recorder check Z45.09    Atrial fibrillation ([facility]) I48.91    CHF (congestive heart failure), NYHA class I, acute on chronic, combined ([facility]) I50.43    On amiodarone therapy Z79.899    On continuous oral anticoagulation Z79.01       Isolation/Infection:   Isolation            No Isolation          Patient Infection Status       Infection Onset Added Last Indicated Last Indicated By Review Planned Expiration Resolved Resolved By    None active    Resolved    COVID-19 (Rule Out) 05/29/22 05/29/22 05/29/22 COVID-19, Rapid (Ordered)   05/29/22 Rule-Out Test Resulted    COVID-19 (Rule Out) 03/18/22 03/18/22 03/18/22 COVID-19 & Influenza Combo (Ordered)   03/18/22 Rule-Out Test Resulted    COVID-19 11/25/20 11/28/20 11/25/20 COVID-19 20             Nurse Assessment:  Last Vital Signs: BP (!) 148/87   Pulse 74   Temp 97.2 °F (36.2 °C) (Oral)   Resp 18   Ht 5' (1.524 m)   Wt 127 lb 13.9 oz (58 kg)   SpO2 93%   BMI 24.97 kg/m²     Last documented pain score (0-10 scale): Pain Level: 6  Last Weight:   Wt Readings from Last 1 Encounters:   22 127 lb 13.9 oz (58 kg)     Mental Status:  {IP PT MENTAL STATUS:}    IV Access:  { ANNEMARIE IV ACCESS:370308334}    Nursing Mobility/ADLs:  Walking   {CHP DME ZDGF:950353397}  Transfer  {CHP DME EKYO:164300064}  Bathing  {CHP DME PNSE:127690952}  Dressing  {CHP DME XAYY:598457319}  Toileting  {CHP DME CLAL:760569223}  Feeding  {CHP DME ZMEY:848077222}  Med Admin  {P DME VIIF:045008917}  Med Delivery   { ANNEMARIE MED Delivery:602252390}    Wound Care Documentation and Therapy:        Elimination:  Continence: Bowel: {YES / XQ:15569}  Bladder: {YES / O}  Urinary Catheter: {Urinary Catheter:971968184}   Colostomy/Ileostomy/Ileal Conduit: {YES / GE:52697}       Date of Last BM: ***    Intake/Output Summary (Last 24 hours) at 2022 1042  Last data filed at 2022 0719  Gross per 24 hour   Intake 694.82 ml   Output 4700 ml   Net -4005.18 ml     I/O last 3 completed shifts: In: 1482.2 [P.O.:720;  I.V.:762.2]  Out: 6975 [RTGLI:1880]    Safety Concerns:     [employer] Safety Concerns:846165084}    Impairments/Disabilities:      [employer] Impairments/Disabilities:891682441}    Nutrition Therapy:  Current Nutrition Therapy:   [employer] Diet List:016997439}    Routes of Feeding: {P DME Other Feedings:723846132}  Liquids: {Slp liquid thickness:71748}  Daily Fluid Restriction: {CHP DME Yes amt example:848303192}  Last Modified Barium Swallow with Video (Video Swallowing Test): {Done Not Done BNSO:543960452}    Treatments at the Time of Hospital Discharge:   Respiratory Treatments: ***  Oxygen Therapy:  {Therapy; copd oxygen:96757}  Ventilator:    { CC Vent REVI:343014045}      Heart Failure Instructions for Daily Management  Patient was treated for acute on chronic combined systolic and diastolic heart failure. she  will require the following:    Please weigh daily on the same scale and approximately the same time of day. Report weight gain of 3 pounds/day or 5 pounds/week to : Jono Pena [phone]. Please use hospital discharge weight as baseline reference. Please monitor for signs and symptoms of and report to MD:  Worsening Heart Failure: sudden weight gain, shortness of breath, lower extremity or general edema/swelling, abdominal bloating/swelling, inability to lie flat, intolerance to usual activity, or cough (especially at night). Report these finding even if no increase in weight. Dehydration:  having difficulty or a decrease in urination, dizziness, worsening fatigue, or new onset/worsening of generalized weakness. Please continue a LOW SODIUM diet and LIMIT fluid intake to 48 - 64 ounces ( 1.5 - 2 liters) per day. Call Jono Pena [phone] with any questions or concerns. Please continue heart failure education to patient and family/support system. See After Visit Summary for hospital follow up appointment details. Consider spiritual care referral for support and/or completion of advance directives . Consider: [place] [address] telehealth program if patient agreeable and able to participate. Patient's primary cardiologist is Dr Gustavo Restrepo  Please draw BMP on 7/3 or  and fax results to [facility] so they can be reviewed with patient at cardiology follow up.       Rehab Therapies: Physical Therapy, Occupational Therapy, and SN, MSW  Weight Bearing Status/Restrictions: [address]  Weight Bearin}  Other Medical Equipment (for information only, NOT a DME order):  {EQUIPMENT:195991863}  Other Treatments: ***    Patient's personal belongings (please select all that are sent with patient):  {[facility] DME Belongings:754698626}    RN SIGNATURE:  {Esignature:563023404}    CASE MANAGEMENT/SOCIAL WORK SECTION    Inpatient Status Date: ***    Readmission Risk Assessment Score:  Readmission Risk              Risk of Unplanned Readmission:  22           Discharging to Facility/ Agency   Name:   Address:  Phone:  Fax:    Dialysis Facility (if applicable)   Name:  Address:  Dialysis Schedule:  Phone:  Fax:    / signature: {Esignature:809220557}    PHYSICIAN SECTION    Prognosis: {Prognosis:4397207737}    Condition at Discharge: [address] [place] [address] Patient Condition:953675768}    Rehab Potential (if transferring to Rehab): {Prognosis:6272957376}    Recommended Labs or Other Treatments After Discharge: ***    Physician Certification: I certify the above information and transfer of Angela Cook  is necessary for the continuing treatment of the diagnosis listed and that she requires {Admit to Appropriate Level of Care:22649} for {GREATER/LESS:178677546} 30 days.      Update Admission H&P: {CHP DME Changes in RXDEI:089677880}    PHYSICIAN SIGNATURE:  {Esignature:030072499}

## 2022-07-11 RX ORDER — ONDANSETRON 4 MG/1
TABLET, ORALLY DISINTEGRATING ORAL
Qty: 30 TABLET | Refills: 1 | Status: SHIPPED | OUTPATIENT
Start: 2022-07-11

## 2022-07-11 RX ORDER — ONDANSETRON 4 MG/1
4 TABLET, ORALLY DISINTEGRATING ORAL EVERY 8 HOURS PRN
Qty: 30 TABLET | Refills: 1 | Status: SHIPPED | OUTPATIENT
Start: 2022-07-11

## 2022-08-08 ENCOUNTER — PATIENT MESSAGE (OUTPATIENT)
Dept: GASTROENTEROLOGY | Facility: CLINIC | Age: 27
End: 2022-08-08

## 2022-08-09 NOTE — TELEPHONE ENCOUNTER
From: Iris Spatz  To: Kelechi Caballero MD  Sent: 8/8/2022 3:02 PM CDT  Subject: Wright Begin Dr. Reese Guzman,  I had seen you a few months ago for nausea and throwing up, and unfortunately I've had those symptoms return recently. It appears you are booked through November with appointments, but I was wondering if we could at least schedule a virtual visit to discuss possible next treatment steps. Please let me know.   Thanks,  Hussain Putnam

## 2022-08-11 NOTE — TELEPHONE ENCOUNTER
Pt scheduled as below:    Monday August 15, 2022  4:30 PM  Follow Up Visit with Skyla Her MD  [facility], [address] [place] [address] ([address] [place] [address]) [address] [phone]

## 2022-08-15 ENCOUNTER — OFFICE VISIT (OUTPATIENT)
Dept: GASTROENTEROLOGY | Facility: CLINIC | Age: 27
End: 2022-08-15
Payer: COMMERCIAL

## 2022-08-15 VITALS
HEIGHT: 73 IN | BODY MASS INDEX: 25.08 KG/M2 | DIASTOLIC BLOOD PRESSURE: 95 MMHG | WEIGHT: 189.19 LBS | HEART RATE: 85 BPM | SYSTOLIC BLOOD PRESSURE: 144 MMHG

## 2022-08-15 DIAGNOSIS — R11.2 NAUSEA AND VOMITING, UNSPECIFIED VOMITING TYPE: Primary | ICD-10-CM

## 2022-08-15 PROCEDURE — 3008F BODY MASS INDEX DOCD: CPT | Performed by: INTERNAL MEDICINE

## 2022-08-15 PROCEDURE — 99212 OFFICE O/P EST SF 10 MIN: CPT | Performed by: INTERNAL MEDICINE

## 2022-08-15 PROCEDURE — 3077F SYST BP >= 140 MM HG: CPT | Performed by: INTERNAL MEDICINE

## 2022-08-15 PROCEDURE — 3080F DIAST BP >= 90 MM HG: CPT | Performed by: INTERNAL MEDICINE

## 2022-08-16 NOTE — PATIENT INSTRUCTIONS
1.  Continue to treat anxiety. 2.  Try taking the ondansetron at bedtime which may be more helpful. 3.  Please contact me if your symptoms continue and you wish to schedule an upper endoscopy.

## 2022-09-13 RX ORDER — ONDANSETRON 4 MG/1
4 TABLET, ORALLY DISINTEGRATING ORAL EVERY 8 HOURS PRN
Qty: 30 TABLET | Refills: 3 | Status: SHIPPED | OUTPATIENT
Start: 2022-09-13

## 2022-09-21 RX ORDER — ONDANSETRON 4 MG/1
TABLET, ORALLY DISINTEGRATING ORAL
Qty: 30 TABLET | Refills: 1 | OUTPATIENT
Start: 2022-09-21

## 2022-11-01 RX ORDER — ONDANSETRON 4 MG/1
4 TABLET, ORALLY DISINTEGRATING ORAL EVERY 8 HOURS PRN
Qty: 30 TABLET | Refills: 3 | Status: SHIPPED | OUTPATIENT
Start: 2022-11-01

## 2022-11-01 NOTE — TELEPHONE ENCOUNTER
LOV: 8/15/2022  NOV: None  Last refill: 7/11/2022      Dr. Alexy Nolan -please review and sign pended order if agreeable.

## 2022-11-26 RX ORDER — ONDANSETRON 4 MG/1
4 TABLET, ORALLY DISINTEGRATING ORAL EVERY 8 HOURS PRN
Qty: 30 TABLET | Refills: 3 | Status: SHIPPED | OUTPATIENT
Start: 2022-11-26

## 2023-01-09 RX ORDER — ONDANSETRON 4 MG/1
4 TABLET, ORALLY DISINTEGRATING ORAL EVERY 8 HOURS PRN
Qty: 30 TABLET | Refills: 3 | Status: SHIPPED | OUTPATIENT
Start: 2023-01-09

## 2023-03-15 ENCOUNTER — TELEPHONE (OUTPATIENT)
Dept: NEUROLOGY | Facility: CLINIC | Age: 28
End: 2023-03-15

## 2023-03-15 NOTE — TELEPHONE ENCOUNTER
Received refill request for LEVETIRACETAM 500MG TABLETS, TAKE 1 TABLET BY MOUTH EVERY MORNING AND 2 TABLET BY MOUTH IN THE EVENING from [facility] #[id number]. Former [facility] pt.  Needs to establish with new provider

## 2023-03-27 ENCOUNTER — TELEPHONE (OUTPATIENT)
Dept: NEUROLOGY | Facility: CLINIC | Age: 28
End: 2023-03-27

## 2023-03-27 NOTE — TELEPHONE ENCOUNTER
Please contact he patient to schedule an OV with one of the neurologist so that the medication request can be sent to the provider. Thanks,  Reviewed and electronically signed by:  CRISELDA Mejia       Order Details    Medication NDC Prescription # KIMBERLY   LEVETIRACETAM 500MG TABLETS [id number] No   Written Date Last Fill Date Quantity Days Supply   3/26/2023 3/26/2023 180 tablet 90   Sig Notes   TAKE 1 TABLET BY MOUTH TWICE DAILY Not Available     Provider Information    Prescribing Provider License EMMIE #   Yoselyn Grills

## 2023-06-12 ENCOUNTER — PATIENT MESSAGE (OUTPATIENT)
Dept: ENDOCRINOLOGY CLINIC | Facility: CLINIC | Age: 28
End: 2023-06-12

## 2023-06-12 ENCOUNTER — OFFICE VISIT (OUTPATIENT)
Dept: ENDOCRINOLOGY CLINIC | Facility: CLINIC | Age: 28
End: 2023-06-12

## 2023-06-12 VITALS — WEIGHT: 206 LBS | BODY MASS INDEX: 27.3 KG/M2 | HEIGHT: 73 IN

## 2023-06-12 DIAGNOSIS — C73 FOLLICULAR THYROID CANCER (HCC): Primary | ICD-10-CM

## 2023-06-12 PROCEDURE — 99244 OFF/OP CNSLTJ NEW/EST MOD 40: CPT | Performed by: INTERNAL MEDICINE

## 2023-06-12 PROCEDURE — 3008F BODY MASS INDEX DOCD: CPT | Performed by: INTERNAL MEDICINE

## 2023-06-13 PROBLEM — C73 FOLLICULAR THYROID CANCER (HCC): Status: ACTIVE | Noted: 2023-06-13

## 2023-06-13 NOTE — TELEPHONE ENCOUNTER
From: Kofi Westbrook  To: Eugenia Fischer MD  Sent: 6/12/2023 11:32 AM CDT  Subject: Latest full lab test from 4/8/23    As discussed with the nurse here, attached are my most recent blood test results including T4 and TSH levels.

## 2023-06-16 NOTE — TELEPHONE ENCOUNTER
Hi!    Please let him know that I have noted the lab test results. Once I see his current test results, I will make a decision about how much to prescribe him. Thank you!

## 2023-07-10 ENCOUNTER — LAB ENCOUNTER (OUTPATIENT)
Dept: LAB | Age: 28
End: 2023-07-10
Attending: INTERNAL MEDICINE
Payer: COMMERCIAL

## 2023-07-10 ENCOUNTER — HOSPITAL ENCOUNTER (OUTPATIENT)
Dept: ULTRASOUND IMAGING | Age: 28
Discharge: HOME | End: 2023-07-10
Attending: INTERNAL MEDICINE
Payer: COMMERCIAL

## 2023-07-10 DIAGNOSIS — C73 FOLLICULAR THYROID CANCER (HCC): ICD-10-CM

## 2023-07-10 PROCEDURE — 84443 ASSAY THYROID STIM HORMONE: CPT

## 2023-07-10 PROCEDURE — 76536 US EXAM OF HEAD AND NECK: CPT | Performed by: INTERNAL MEDICINE

## 2023-07-10 PROCEDURE — 86800 THYROGLOBULIN ANTIBODY: CPT

## 2023-07-10 PROCEDURE — 36415 COLL VENOUS BLD VENIPUNCTURE: CPT

## 2023-07-10 PROCEDURE — 84439 ASSAY OF FREE THYROXINE: CPT

## 2023-07-11 LAB
T4 FREE SERPL-MCNC: 1.2 NG/DL (ref 0.8–1.7)
TSI SER-ACNC: 1.08 MIU/ML (ref 0.36–3.74)

## 2023-07-12 LAB
THYROGLOB AB: <1 IU/ML
THYROGLOB IMA: <0.1 NG/ML

## 2023-07-19 ENCOUNTER — PATIENT MESSAGE (OUTPATIENT)
Dept: ENDOCRINOLOGY CLINIC | Facility: CLINIC | Age: 28
End: 2023-07-19

## 2023-07-21 NOTE — TELEPHONE ENCOUNTER
From: Charla Ha  To: Eugenia Brush MD  Sent: 7/19/2023 12:34 PM CDT  Subject: Refill Levothyroxine Prescription    Hi Dr. Kathy Brush,  Based on your message and my results coming back in an acceptable range, might you be able to prescribe an adjusted prescription for my Levothyroxine so that I can take just 1 pill a day instead of 1.5? From taking 1.5 112ug pills up to a single 168ug pill by my math?

## 2023-07-21 NOTE — TELEPHONE ENCOUNTER
Dr. Kay Siu, see patient message. Asking if there is a dose where he can take 1 full tab instead of cutting in half. See lab results.      Component      Latest Ref Rng 7/10/2023   T4,Free (Direct)      0.8 - 1.7 ng/dL 1.2    TSH      0.358 - 3.740 mIU/mL 1.080    Thyroglob Ab      0.0 - 0.9 IU/mL <1.0    Thyroglob GHULAM      1.4 - 29.2 ng/mL <0.1 (L) Pt will successfully establish breastfeeding by feeding in response to early feeding cues   or wake every 3h, will obtain deep latch, and will keep log of feedings/output. Taught to BF at hunger cues and or q 2-3 hrs and to offer 10-20 drops of hand expressed colostrum at any non-feeds. Breast Assessment  Left Breast: Medium  Left Nipple: Everted, Intact (piercing)  Right Breast: Medium  Right Nipple: Everted, Intact (piercing)  Breast- Feeding Assessment  Attends Breast-Feeding Classes: No  Breast-Feeding Experience: No  Breast Trauma/Surgery: Yes (Bilateral nipple piercings done 2 yrs. ago. Had rings removed 1 yr. ago)  Type/Quality: Good (Mother states baby has been latching on and breastfeeding well. When baby is sleepy she has been expressing drops.)  Lactation Consultant Visits  Breast-Feedings: Good  (Baby latched on vigorously to right breast during [facility] visit)  Mother/Infant Observation  Mother Observation: Alignment, Holds breast, Lets baby end feeding, Nipple round on release, Recognizes feeding cues, Sleepy after feeding  Infant Observation: Latches nipple and aereolae, Rhythmic suck, Relaxed after feeding, Opens mouth, Lips flanged, upper, Lips flanged, lower, Audible swallows  LATCH Documentation  Latch: Grasps breast, tongue down, lips flanged, rhythmic sucking  Audible Swallowing: Spontaneous and intermittent (24 hours old)  Type of Nipple: Everted (after stimulation)  Comfort (Breast/Nipple): Soft/non-tender  Hold (Positioning): No assist from staff, mother able to position/hold infant  LATCH Score: 10  Chart shows numerous feedings, void, stool WNL. Discussed importance of monitoring outputs and feedings on first week of life. Discussed ways to tell if baby is  getting enough breast milk, ie  voids and stools, change in color of stool, and return to birth wt within 2 weeks.   Follow up with pediatrician visit for weight check in 1-2 days (per AAP guidelines.)  Encouraged to call Warm Line [phone] or [facility] at [phone] for any questions/problems that arise. Mother also given breastfeeding support group dates and times for any future needsAnticipatory guidance given. Questions answered. Discussed signs of baby's allergy, excema. Discussed engorgement management, when breast are soft and flat you are making more milk than when hard and engorged. If you should have to take a medication and MD says can't breast feed contact lactation office. Breast feed if you or the baby gets sick to pass along natural antibiotics. Started Mom pumping since baby has a drop in weight. Mom states\" I am going to the store today to get a breast pump. \"  Instructed Mom to pump and manually express for 10 to 15 minutes post feed

## 2023-07-24 NOTE — TELEPHONE ENCOUNTER
Hi!  Please ask him if he thinks that he could take 1 tablet 6 days a week and 2 tablets on the 7th of a 150mcg dose? And if he is taking levothyroxine or Synthroid. Then I will prescribe it this way and we can check labs in 3 months. Thank you!

## 2023-08-07 NOTE — TELEPHONE ENCOUNTER
Dr. Glaser [facility], patient agreeable to 1 tablet 6 days a week and 2 tablets on the 7th of a 150mcg dose. Rx pended.

## 2023-08-09 DIAGNOSIS — R56.9 SEIZURE (HCC): ICD-10-CM

## 2023-08-09 NOTE — TELEPHONE ENCOUNTER
Refill request:  levETIRAcetam (KEPPRA) 500 MG Oral Tab     Former Dr. Vanessa Torres Patient; has September 19th appointment   with Dr. Giulia Novak

## 2023-08-09 NOTE — TELEPHONE ENCOUNTER
The patient is requesting a refill on: Keppra 500mg - Take one tablet by mouth in the morning & two tablets by mouth in the evening     Last OV: 12/21/21  Next OV: 9/27/23  Last refilled: 1/10/22 #270 with 3 refill

## 2023-08-10 RX ORDER — LEVETIRACETAM 500 MG/1
TABLET ORAL
Qty: 270 TABLET | Refills: 0 | Status: SHIPPED | OUTPATIENT
Start: 2023-08-10

## 2023-08-15 NOTE — TELEPHONE ENCOUNTER
Dr. Eneida Vogel, see patient message. He is taking generic levothyroxine and is willing to take 1 tablet 6 days per week and 2 tablets 1 day per week. Rx pended.

## 2023-08-17 RX ORDER — LEVOTHYROXINE SODIUM 0.15 MG/1
TABLET ORAL
Qty: 120 TABLET | Refills: 0 | Status: SHIPPED | OUTPATIENT
Start: 2023-08-17

## 2023-08-22 ENCOUNTER — OFFICE VISIT (OUTPATIENT)
Dept: NEUROLOGY | Facility: CLINIC | Age: 28
End: 2023-08-22
Payer: COMMERCIAL

## 2023-08-22 VITALS — HEIGHT: 73 IN | WEIGHT: 206 LBS | BODY MASS INDEX: 27.3 KG/M2

## 2023-08-22 DIAGNOSIS — R56.9 SEIZURE (HCC): Primary | ICD-10-CM

## 2023-08-22 PROCEDURE — 99214 OFFICE O/P EST MOD 30 MIN: CPT | Performed by: OTHER

## 2023-08-22 PROCEDURE — 3008F BODY MASS INDEX DOCD: CPT | Performed by: OTHER

## 2023-08-22 RX ORDER — ERGOCALCIFEROL 1.25 MG/1
50000 CAPSULE ORAL WEEKLY
COMMUNITY
Start: 2023-07-18

## 2023-08-22 RX ORDER — LEVETIRACETAM 500 MG/1
TABLET ORAL
Qty: 270 TABLET | Refills: 3 | Status: SHIPPED | OUTPATIENT
Start: 2023-08-22

## 2023-08-22 RX ORDER — HYDROXYZINE PAMOATE 25 MG/1
CAPSULE ORAL
COMMUNITY
Start: 2023-04-10

## 2023-08-22 NOTE — PROGRESS NOTES
Neurology Follow up Visit     Referred By: Dr. Tamez ref. provider found    Chief Complaint: Patient presents with:  Seizure Disorder: Former patient of Dr. Saintclair Shanks, here to re-establish care. States his last episode of seizures was in March, was off Keppra 10 days prior. Takes Keppra 500 mg 1 tablet in the morning and 2 tablets at night. HPI:     Guerrero Rosen is a 29year old male, who presents for history of epilepsy. Patient has had at least 3 episodes of seizures as reported in first visit in August 2023. We will happen while he was off of medication. First 1 had happened in 2021, MRI of the brain and EEG were not revealing at that time, apparently he had underwent October 2021 and after that he was started with Keppra. There are some messages in the chart about him being concerned for interaction of melatonin and Keppra. Also concerns of depression. However an appointment with me in August 2023 he was reporting that his depression was likely related to the lack of treatment of thyroid problems to the [employer] [address]. He was still interested in continuing with Keppra medication. The third seizure happened when he was very depressed and apparently he was lapsing and taking his medication and had another tonic-clonic seizure.   He does feel clouding in general..     Past Medical History:   Diagnosis Date    Blurry vision 2013    Congenital scoliosis     Esophageal reflux     Fracture, finger     fx left 4th finger - surgical repair    Hypothyroidism     Thyroid removed d/t CA 2011    Myopia with astigmatism 2013    OU    Seizure disorder [facility])     throid cancer age 12     Thyroid carcinoma ([facility] [address]     thyroidectomy    Thyroid carcinoma ([facility] [address] 2011       Past Surgical History:   Procedure Laterality Date    HAND/FINGER SURGERY UNLISTED      THYROIDECTOMY  2011       Social history:    Smoking status:   Never      Smokeless tobacco:   Current       Alcohol use   Yes      Comment:   Last drink 8/01          Drug use:      7 times per week      Types:   Cannabis, Amphetamines      Comment:   Vape about 5 times per week (\"4 puffs per night\"). History of Adderall use. Family History   Problem Relation Age of Onset    Glaucoma Father     Lipids Father         hyperlipidemia    Melanoma Father     Other (hyperlipidemia) Father     Hypertension Mother     Ovarian Cancer Maternal Grandmother     Other (alzheimer's disease) Paternal Grandmother     Other (Myocardial infarction) Paternal Grandfather     Depression Sister     Lung Disorder Sister         exercise induced bronchitis    Depression Brother     Anxiety Brother          Current Outpatient Medications:     ergocalciferol 1.25 MG (74710 UT) Oral Cap, Take 1 capsule (50,000 Units total) by mouth once a week., Disp: , Rfl:     hydrOXYzine Pamoate 25 MG Oral Cap, TAKE 1 TO 2 CAPSULES BY MOUTH FOUR TIMES DAILY UNTIL FUTHER NOTICE AS NEEDED, Disp: , Rfl:     levETIRAcetam (KEPPRA) 500 MG Oral Tab, Take one tablet by mouth in the morning & two tablets by mouth in the evening, Disp: 270 tablet, Rfl: 3    levothyroxine 150 MCG Oral Tab, Take 1 tablet 6 days per week and 2 tablets 1 day per week., Disp: 120 tablet, Rfl: 0    acamprosate 333 MG Oral Tab EC, Take 1 tablet (333 mg total) by mouth 3 (three) times daily. , Disp: 90 tablet, Rfl: 0    vilazodone 20 MG Oral Tab, Take 1 tablet (20 mg total) by mouth daily. , Disp: 30 tablet, Rfl: 0    Cholecalciferol (VITAMIN D3) 1.25 MG (01323 UT) Oral Cap, Take 50,000 Int'l Units/day by mouth once a week., Disp: , Rfl:     gabapentin 300 MG Oral Cap, Take 1 capsule (300 mg total) by mouth 3 (three) times daily as needed (Take 1-2 capsules three times daily as needed). , Disp: , Rfl:     hydrOXYzine 25 MG Oral Tab, Take 1 tablet (25 mg total) by mouth 4 (four) times daily as needed for Anxiety.  Bottle states: \"take 1-2 capsules by mouth four times daily as needed\", Disp: , Rfl:     thiamine 100 MG Oral Tab, Take 1 tablet (100 mg total) by mouth daily. (Patient not taking: Reported on 8/8/2023), Disp: , Rfl:     rosuvastatin 20 MG Oral Tab, Take 1 tablet (20 mg total) by mouth nightly., Disp: , Rfl:     ALPRAZolam 0.25 MG Oral Tab, Take 1 tablet (0.25 mg total) by mouth 2 (two) times daily as needed for Anxiety. (Patient not taking: Reported on 5/9/2023), Disp: 30 tablet, Rfl: 2    Levothyroxine Sodium 112 MCG Oral Tab, Take 1 tablet (112 mcg total) by mouth daily. Bottle states: \"take 1.5 tabs daily\", Disp: , Rfl:     Levocetirizine Dihydrochloride (XYZAL) 5 MG Oral Tab, Take 1 tablet (5 mg total) by mouth nightly., Disp: 30 tablet, Rfl: 2      Amoxicillin             HIVES, RASH    Comment:Other reaction(s): hives  Silver                  UNKNOWN  Adhesive Tape           RASH    Comment:Other reaction(s): Rash  Other                   RASH    Comment:tegaderm  Tegaderm Hydrogel W*    RASH    ROS:   As in HPI, the rest of the 14 system review was done and was negative      Physical Exam:   08/22/23  0831   Weight: 206 lb (93.4 kg)   Height: 73\"       General: No apparent distress, well nourished, well groomed. Head- Normocephalic, atraumatic  Eyes- No redness or swelling  ENT- Hearing intake, normal glutition  Neck- No masses or adenopathy  Cv: pulses were palpable and normal, no cyanosis or edema     Neurological:     Mental Status- Alert and oriented x3. Normal attention span and concentration  Thought process intact  Memory intact- recent and remote  Mood intact  Fund of knowledge appropriate for education and age    Language intact including: comprehension, naming, repetition, vocabulary    Cranial Nerves:    VII. Face symmetric, no facial weakness  VIII. Hearing intact to whisper. IX. Pallet elevates symmetrically. XI. Shoulder shrug is intact  XII.  Tongue is midline    Motor Exam:  Muscle tone normal  No atrophy or fasciculations  Strength- upper extremities 5/5 proximally and distally                  Rapid alternating movements intact    Gait:  Normal posture  Normal physiologic      Labs:    Lab Results   Component Value Date    TSH 1.080 07/10/2023     Lab Results   Component Value Date    HDL 65 (H) 06/19/2020     (H) 06/19/2020    TRIG 91 06/19/2020     Lab Results   Component Value Date    HGB 14.8 06/30/2021    HCT 44.8 06/30/2021    MCV 95.7 06/30/2021    WBC 7.9 06/30/2021    .0 06/30/2021      Lab Results   Component Value Date    BUN 8 06/30/2021    CA 8.9 06/30/2021    ALT 29 06/30/2021    AST 37 06/30/2021    ALKPHOS 68 06/04/2015    ALB 4.0 06/30/2021     06/30/2021    K 4.1 06/30/2021     06/30/2021    CO2 28.0 06/30/2021      I have reviewed labs. Imaging Studies:  I have independently reviewed imaging. MRI of the brain was independent reviewed, no obvious epileptogenic focus      Assessment   1. Seizure ([address]  Idiopathic epilepsy, at least 3 seizures. However while on medication it seems to be well controlled. We discussed potential side effects of Keppra with mood abnormalities and especially depression. Still patient decided to continue with Clarene Apley since he felt a his depression was related to the thyroid disease rather than Keppra itself. Follow-up will be checked again.    - COMP METABOLIC PANEL (14)  - CBC WITH DIFFERENTIAL WITH PLATELET  - LEVETIRACETAM, S; Future  - levETIRAcetam (KEPPRA) 500 MG Oral Tab; Take one tablet by mouth in the morning & two tablets by mouth in the evening  Dispense: 270 tablet; Refill: 3  - CT BRAIN OR HEAD (50512); Future  - MRI BRAIN (W+WO) (CPT=70553); Future           Education and counseling provided to patient. Instructed patient to call my office or seek medical attention immediately if symptoms worsen. Patient verbalized understanding of information given. All questions were answered. All side effects of drugs were discussed. Return to clinic in: Return in about 1 year (around 8/22/2024).     Cleavon Dubin, MD

## 2023-08-23 ENCOUNTER — LAB ENCOUNTER (OUTPATIENT)
Dept: LAB | Age: 28
End: 2023-08-23
Attending: Other
Payer: COMMERCIAL

## 2023-08-23 DIAGNOSIS — R56.9 SEIZURE (HCC): ICD-10-CM

## 2023-08-23 LAB
ALBUMIN SERPL-MCNC: 4.6 G/DL (ref 3.4–5)
ALBUMIN/GLOB SERPL: 1.2 {RATIO} (ref 1–2)
ALP LIVER SERPL-CCNC: 86 U/L
ALT SERPL-CCNC: 53 U/L
ANION GAP SERPL CALC-SCNC: 10 MMOL/L (ref 0–18)
AST SERPL-CCNC: 32 U/L (ref 15–37)
BASOPHILS # BLD AUTO: 0.07 X10(3) UL (ref 0–0.2)
BASOPHILS NFR BLD AUTO: 0.9 %
BILIRUB SERPL-MCNC: 1.3 MG/DL (ref 0.1–2)
BUN BLD-MCNC: 8 MG/DL (ref 7–18)
BUN/CREAT SERPL: 6.5 (ref 10–20)
CALCIUM BLD-MCNC: 9.9 MG/DL (ref 8.5–10.1)
CHLORIDE SERPL-SCNC: 105 MMOL/L (ref 98–112)
CO2 SERPL-SCNC: 24 MMOL/L (ref 21–32)
CREAT BLD-MCNC: 1.24 MG/DL
DEPRECATED RDW RBC AUTO: 43.8 FL (ref 35.1–46.3)
EGFRCR SERPLBLD CKD-EPI 2021: 81 ML/MIN/1.73M2 (ref 60–?)
EOSINOPHIL # BLD AUTO: 0.14 X10(3) UL (ref 0–0.7)
EOSINOPHIL NFR BLD AUTO: 1.8 %
ERYTHROCYTE [DISTWIDTH] IN BLOOD BY AUTOMATED COUNT: 13.7 % (ref 11–15)
FASTING STATUS PATIENT QL REPORTED: YES
GLOBULIN PLAS-MCNC: 3.7 G/DL (ref 2.8–4.4)
GLUCOSE BLD-MCNC: 111 MG/DL (ref 70–99)
HCT VFR BLD AUTO: 50.2 %
HGB BLD-MCNC: 16.9 G/DL
IMM GRANULOCYTES # BLD AUTO: 0.02 X10(3) UL (ref 0–1)
IMM GRANULOCYTES NFR BLD: 0.3 %
LYMPHOCYTES # BLD AUTO: 1.84 X10(3) UL (ref 1–4)
LYMPHOCYTES NFR BLD AUTO: 23.7 %
MCH RBC QN AUTO: 29.5 PG (ref 26–34)
MCHC RBC AUTO-ENTMCNC: 33.7 G/DL (ref 31–37)
MCV RBC AUTO: 87.8 FL
MONOCYTES # BLD AUTO: 0.56 X10(3) UL (ref 0.1–1)
MONOCYTES NFR BLD AUTO: 7.2 %
NEUTROPHILS # BLD AUTO: 5.12 X10 (3) UL (ref 1.5–7.7)
NEUTROPHILS # BLD AUTO: 5.12 X10(3) UL (ref 1.5–7.7)
NEUTROPHILS NFR BLD AUTO: 66.1 %
OSMOLALITY SERPL CALC.SUM OF ELEC: 287 MOSM/KG (ref 275–295)
PLATELET # BLD AUTO: 377 10(3)UL (ref 150–450)
POTASSIUM SERPL-SCNC: 3.9 MMOL/L (ref 3.5–5.1)
PROT SERPL-MCNC: 8.3 G/DL (ref 6.4–8.2)
RBC # BLD AUTO: 5.72 X10(6)UL
SODIUM SERPL-SCNC: 139 MMOL/L (ref 136–145)
WBC # BLD AUTO: 7.8 X10(3) UL (ref 4–11)

## 2023-08-23 PROCEDURE — 80053 COMPREHEN METABOLIC PANEL: CPT | Performed by: OTHER

## 2023-08-23 PROCEDURE — 80177 DRUG SCRN QUAN LEVETIRACETAM: CPT | Performed by: OTHER

## 2023-08-23 PROCEDURE — 85025 COMPLETE CBC W/AUTO DIFF WBC: CPT | Performed by: OTHER

## 2023-08-24 ENCOUNTER — TELEPHONE (OUTPATIENT)
Dept: NEUROLOGY | Facility: CLINIC | Age: 28
End: 2023-08-24

## 2023-08-24 NOTE — TELEPHONE ENCOUNTER
----- Message from Jeana Arriaza MD sent at 8/24/2023  8:02 AM CDT -----  Please let the patient know that results of these particular lab tests so far were normal.    Thank you

## 2023-08-25 ENCOUNTER — TELEPHONE (OUTPATIENT)
Dept: NEUROLOGY | Facility: CLINIC | Age: 28
End: 2023-08-25

## 2023-08-25 LAB — LEVETIRACETAM LVL: 22.7 UG/ML

## 2023-08-25 NOTE — TELEPHONE ENCOUNTER
----- Message from Maida Morales MD sent at 8/25/2023 10:53 AM CDT -----  Please let the patient know that results of these particular lab tests so far were normal.    Thank you

## 2023-09-28 DIAGNOSIS — R56.9 SEIZURE (HCC): ICD-10-CM

## 2023-09-28 RX ORDER — LEVETIRACETAM 500 MG/1
TABLET ORAL
Qty: 270 TABLET | Refills: 3 | OUTPATIENT
Start: 2023-09-28

## 2023-09-28 NOTE — TELEPHONE ENCOUNTER
Requested Prescriptions     Pending Prescriptions Disp Refills    levETIRAcetam (KEPPRA) 500 MG Oral Tab 270 tablet 3     Sig: Take one tablet by mouth in the morning & two tablets by mouth in the evening        LOV: 8/22/23  NOV: none    Last refill/[place]: 8/22/23    Denied-1 year supply sent to [facility] on 8/22/23

## 2023-11-12 DIAGNOSIS — R56.9 SEIZURE (HCC): ICD-10-CM

## 2023-11-13 RX ORDER — LEVETIRACETAM 500 MG/1
TABLET ORAL
Qty: 270 TABLET | Refills: 3 | Status: SHIPPED | OUTPATIENT
Start: 2023-11-13

## 2023-11-16 ENCOUNTER — HOSPITAL ENCOUNTER (OUTPATIENT)
Dept: CT IMAGING | Facility: HOSPITAL | Age: 28
Discharge: HOME | End: 2023-11-16
Attending: Other
Payer: COMMERCIAL

## 2023-11-16 DIAGNOSIS — R56.9 SEIZURE (HCC): ICD-10-CM

## 2023-11-16 PROCEDURE — 70450 CT HEAD/BRAIN W/O DYE: CPT | Performed by: OTHER

## 2023-11-17 ENCOUNTER — TELEPHONE (OUTPATIENT)
Dept: NEUROLOGY | Facility: CLINIC | Age: 28
End: 2023-11-17

## 2023-11-17 NOTE — TELEPHONE ENCOUNTER
----- Message from Carlie Lea MD sent at 11/17/2023  8:22 AM CST -----  Please let patient know that CT brain didn't show significant abnormalities.

## 2024-03-13 NOTE — TELEPHONE ENCOUNTER
LOV- 6/12/23    RTC- 3-6 months    F/U- no appointment scheduled. Sent pt a [employer] message to remind him to schedule a follow up.     Last refill- 8/17/23

## 2024-03-14 RX ORDER — LEVOTHYROXINE SODIUM 0.15 MG/1
TABLET ORAL
Qty: 120 TABLET | Refills: 0 | Status: SHIPPED | OUTPATIENT
Start: 2024-03-14 | End: 2024-06-13

## 2024-06-13 ENCOUNTER — OFFICE VISIT (OUTPATIENT)
Dept: ENDOCRINOLOGY CLINIC | Facility: CLINIC | Age: 29
End: 2024-06-13

## 2024-06-13 VITALS
BODY MASS INDEX: 24.83 KG/M2 | SYSTOLIC BLOOD PRESSURE: 116 MMHG | HEIGHT: 73 IN | WEIGHT: 187.38 LBS | HEART RATE: 81 BPM | DIASTOLIC BLOOD PRESSURE: 88 MMHG

## 2024-06-13 DIAGNOSIS — E89.0 POSTOPERATIVE HYPOTHYROIDISM: Primary | ICD-10-CM

## 2024-06-13 DIAGNOSIS — R53.83 FATIGUE, UNSPECIFIED TYPE: ICD-10-CM

## 2024-06-13 PROCEDURE — 3008F BODY MASS INDEX DOCD: CPT | Performed by: INTERNAL MEDICINE

## 2024-06-13 PROCEDURE — 99214 OFFICE O/P EST MOD 30 MIN: CPT | Performed by: INTERNAL MEDICINE

## 2024-06-13 PROCEDURE — 3079F DIAST BP 80-89 MM HG: CPT | Performed by: INTERNAL MEDICINE

## 2024-06-13 PROCEDURE — 3074F SYST BP LT 130 MM HG: CPT | Performed by: INTERNAL MEDICINE

## 2024-06-13 NOTE — PROGRESS NOTES
Name: Bakari Avina  Date: 6/13/24    Referring Physician: No ref. provider found    Chief Complaint   Patient presents with    Hypothyroidism     Follow up, last labs done 5/2024       HISTORY OF PRESENT ILLNESS   Bakari Avina is a 29 year old male who presents for   Chief Complaint   Patient presents with    Hypothyroidism     Follow up, last labs done 5/2024     This is a 29 year-old man here for follow-up of thyroid cancer. He was diagnosed when he was 16, in 2011. He had surgery and then LI. This was at [facility].     Thyroid nodule detected on routine examination for short stature in 3/11. FNA showed follicular neoplasm. Surgery in 6/11 showed 1.7x1.4x1.4 nodule right lobe. Completion thyroidectomy 7/11.     10/11 123I scan:    Three foci of radioiodine localization as described. The largest of these is in the neck slightly to the right of midline with 24 hour radioiodine uptake of 0.73%. This may represent residual thyroid cancer or residual normal thyroid.    In May of 2014, he had a suspicious level 2 node on the right on an US exam, but the FNA was negative.    He was currently taking 224mcg 4 days a week- 128mcg PO qday since May 15th; Prior to this, for a month, he was taking 168mcg.     At the last visit, I had increased his dose to 171mcg and asked him to have repeat labs and he has come back for follow up.    Medical History:   Past Medical History:    Blurry vision    Congenital scoliosis    Esophageal reflux    Fracture, finger    fx left 4th finger - surgical repair    Hypothyroidism    Thyroid removed d/t CA 2011    Myopia with astigmatism    OU    Seizure disorder (HCC)    throid cancer age 16    Thyroid carcinoma (HCC)    thyroidectomy    Thyroid carcinoma (HCC)       Surgical History:   Past Surgical History:   Procedure Laterality Date    Hand/finger surgery unlisted      Thyroidectomy  2011       Family History:  Family History   Problem Relation Age of Onset    Glaucoma Father      Lipids Father         hyperlipidemia    Melanoma Father     Other (hyperlipidemia) Father     Hypertension Mother     Ovarian Cancer Maternal Grandmother     Other (alzheimer's disease) Paternal Grandmother     Other (Myocardial infarction) Paternal Grandfather     Depression Sister     Lung Disorder Sister         exercise induced bronchitis    Depression Brother     Anxiety Brother        Social History:   Social History     Socioeconomic History    Marital status: Single   Tobacco Use    Smoking status: Never    Smokeless tobacco: Current    Tobacco comments:     8-10 pouches per day   Vaping Use    Vaping status: Every Day    Substances: THC    Devices: Pre-filled or refillable cartridge   Substance and Sexual Activity    Alcohol use: Yes     Comment: Last drink 8/01    Drug use: Yes     Frequency: 7.0 times per week     Types: Cannabis, Amphetamines     Comment: Vape about 5 times per week (\"4 puffs per night\"). History of Adderall use.   Other Topics Concern    Pt has a pacemaker No    Pt has a defibrillator No    Reaction to local anesthetic No    Caffeine Concern Yes       Medications:     Current Outpatient Medications:     levothyroxine 150 MCG Oral Tab, Take 1 tablet 6 days per week and 2 tablets 1 day per week., Disp: 120 tablet, Rfl: 0    gabapentin 300 MG Oral Cap, Take 1 capsule (300 mg total) by mouth in the morning, at noon, and at bedtime., Disp: 90 capsule, Rfl: 1    hydrOXYzine Pamoate 50 MG Oral Cap, Take 1 capsule (50 mg total) by mouth 4 (four) times daily as needed for Anxiety., Disp: 120 capsule, Rfl: 0    levETIRAcetam 500 MG Oral Tab, TAKE 1 TABLET BY MOUTH IN THE MORNING AND 2 TABLETS BY MOUTH IN THE EVENING, Disp: 270 tablet, Rfl: 3    ergocalciferol 1.25 MG (04711 UT) Oral Cap, Take 1 capsule (50,000 Units total) by mouth once a week., Disp: , Rfl:     hydrOXYzine Pamoate 25 MG Oral Cap, TAKE 1 TO 2 CAPSULES BY MOUTH FOUR TIMES DAILY UNTIL FUTHER NOTICE AS NEEDED, Disp: , Rfl:      Cholecalciferol (VITAMIN D3) 1.25 MG (70912 UT) Oral Cap, Take 50,000 Int'l Units/day by mouth once a week., Disp: , Rfl:     thiamine 100 MG Oral Tab, Take 1 tablet (100 mg total) by mouth daily. (Patient not taking: Reported on 8/8/2023), Disp: , Rfl:     rosuvastatin 20 MG Oral Tab, Take 1 tablet (20 mg total) by mouth nightly., Disp: , Rfl:     Levothyroxine Sodium 112 MCG Oral Tab, Take 1 tablet (112 mcg total) by mouth daily. Bottle states: \"take 1.5 tabs daily\", Disp: , Rfl:     Levocetirizine Dihydrochloride (XYZAL) 5 MG Oral Tab, Take 1 tablet (5 mg total) by mouth nightly., Disp: 30 tablet, Rfl: 2     Allergies:   Allergies   Allergen Reactions    Amoxicillin HIVES and RASH     Other reaction(s): hives    Silver UNKNOWN    Adhesive Tape RASH     Other reaction(s): Rash    Other RASH     tegaderm    Tegaderm Hydrogel Wound Filler [Amerigel Wound Dressing] RASH       REVIEW OF SYSTEMS  Eyes: no change in vision  Neurologic: no headache, generalized or focal weakness or numbness.  Head: normal  ENT: normal  Lungs: no shortness of breath, wheezing or CHAUDHARI  Cardiovascular:  no chest pain or palpitations  Gastrointestinal:  no abdominal pain, bowel movement problems  Musculoskeletal: no muscle pain or arthralgia  /Gyne: no frequency or discomfort while urinating  Psychiatric:  no acute distress, anxiety  or depression  Skin: normal moisturized skin    PHYSICAL EXAMINATION:  /88   Pulse 81   Ht 6' 1\" (1.854 m)   Wt 187 lb 6.4 oz (85 kg)   BMI 24.72 kg/m²     General Appearance:  Alert, in no acute distress, well developed  Eyes: normal conjunctivae, sclera.  Ears/Nose/Mouth/Throat/Neck:  normal hearing, normal speech and transverse scar is present  Neck: Trachea midline  Neuro:  sensory grossly intact and motor grossly intact  Psychiatric:  oriented to time, self, and place  Nutritional:  no abnormal weight gain or loss    Labs:  Date  TSH  FT4  LT4  05/16/24 0.029  1.8  171mcg        Imaging:  7/10/23  PROCEDURE: US HEAD/NECK (CPT=76536)     COMPARISON: None.     INDICATIONS: C73 Follicular thyroid cancer (HCC) post total thyroidectomy.     TECHNIQUE: High-resolution ultrasound was performed of the thyroid gland.     FINDINGS:  RIGHT LOBE: Thyroidectomy.  No recurrent or residual thyroid tissue     LEFT LOBE: Thyroidectomy.  No recurrent or residual thyroid tissue.        OTHER: Nonenlarged left submandibular gland lymph node measuring 1.9 x 0.5 cm and 1.8 x 0.6 cm.              Impression  CONCLUSION:  1. Post total thyroidectomy.  No residual or recurrent thyroid tissue.  2. Nonenlarged cyst left submandibular gland region lymph nodes.           Dictated by (CST): Ulices Garcia MD on 7/10/2023 at 7:10 PM      Finalized by (CST): Ulices Garcia MD on 7/10/2023 at 7:12 PM          ASSESSMENT/PLAN: -This is a 29 year-old male patient with a 12 year history of hypothyroidism from total thyroidectomy for minimally invasive follicular thyroid cancer.     - We discussed the diagnosis of hypothyroidism.  - We discussed the nonspecific nature of the symptoms of thyroid disease.  - We discussed that occasionally we require optimization of thyroid levels to TSH 1.0  - We also discussed that if symptoms do not improve on optimized levels then we can always consider combination treatment with T4 and T3  - I reminded the patient about the proper way in which to take the medication (an hour before any medications or food and that they may take two the next day if they forget to take a dose today) and they acknowledged understanding.    - Check thyroid US  - Decrease levothyroxine dose to 150mcg PO qday  - Check TSH and FT4 in 3 months    Return to clinic in 3-6 months    Prior to this encounter, I spent over 15 minutes with preparing for the visit, including reviewing documents from other specialties as well as from PCP and going over test results and imaging studies. During the face to face encounter, I  spent an additional 15 minutes which were determined for follow-up. Greater than 50% of the time was spent in counseling, anticipatory guidance, and coordination of care. Patient concerns were answered to the best of my knowledge.       6/13/2024  Eugenia Mancilla MD

## 2024-06-14 RX ORDER — LEVOTHYROXINE SODIUM 0.15 MG/1
TABLET ORAL
Qty: 120 TABLET | Refills: 1 | Status: SHIPPED | OUTPATIENT
Start: 2024-06-14 | End: 2024-06-22

## 2024-06-19 ENCOUNTER — OFFICE VISIT (OUTPATIENT)
Facility: CLINIC | Age: 29
End: 2024-06-19

## 2024-06-19 VITALS
WEIGHT: 187 LBS | HEART RATE: 97 BPM | BODY MASS INDEX: 24.78 KG/M2 | SYSTOLIC BLOOD PRESSURE: 130 MMHG | HEIGHT: 73 IN | DIASTOLIC BLOOD PRESSURE: 91 MMHG

## 2024-06-19 DIAGNOSIS — R63.4 WEIGHT LOSS: Primary | ICD-10-CM

## 2024-06-19 DIAGNOSIS — R11.0 NAUSEA: ICD-10-CM

## 2024-06-19 PROCEDURE — 3080F DIAST BP >= 90 MM HG: CPT | Performed by: INTERNAL MEDICINE

## 2024-06-19 PROCEDURE — 3075F SYST BP GE 130 - 139MM HG: CPT | Performed by: INTERNAL MEDICINE

## 2024-06-19 PROCEDURE — 99213 OFFICE O/P EST LOW 20 MIN: CPT | Performed by: INTERNAL MEDICINE

## 2024-06-19 PROCEDURE — 3008F BODY MASS INDEX DOCD: CPT | Performed by: INTERNAL MEDICINE

## 2024-06-19 NOTE — PROGRESS NOTES
Subjective:   Patient ID: Bakari Avina is a 29 year old male.    HPI  Bakari returns in follow-up.  He was last seen in August 2022.    As per previous notes Bakari was evaluated in 2014 for intermittent and cyclical episodes of postprandial nausea along with diarrhea.  We had discussed symptoms related to anxiety.  Sprue serology and inflammatory markers were negative.  Additional evaluation has included a negative MRI of the brain, negative H. pylori serology and a normal fecal calprotectin.    In September 2021 Bakari was evaluated for nausea and vomiting believed to be due to anxiety and alcohol use.  Ondansetron was prescribed with recommendations for endoscopy if symptoms were ongoing.    Bakari was diagnosed with COVID-19 in July 4, 2022.  This resulted in transient anorexia.    At the time of the patient's last visit he endorsed recurrent cycles of nausea and vomiting which creates anxiety which causes further nausea and vomiting.  The symptoms occurred in the setting of alcohol and marijuana use.  The symptoms were felt to be due to anxiety and substance abuse.  We discussed treatment of these entities, symptomatic treatment and an upper endoscopy if symptoms continued.    Current history:  Bakari states that he has been sober for about 30 days.  He is frustrated, however, that despite sobriety he still has no appetite.  He experiences nausea with eating and scant amounts of vomiting of either clear fluid or food (he predominantly has the dry heaves).  He has had 2 episodes of vomiting over the past 1 month occurring at the end of the day.  Most notably he has lost 20 pounds of weight over the past 1 year.  He denies dysphagia.  He has \"a little heartburn\".  He notes gurgling, discomfort and gas pains in his abdomen and chest.  Bowel movements remain irregular.  He may have #6 stools in the morning alternating with tenesmus and the urge to have a bowel movement but being unable to do so.  He has  noted no bleeding.    He has been increasing the use of dipping tobacco.  He takes #4 Excedrin tablets a few times weekly.    He has followed up with endocrinology.  The symptoms are not felt to be thyroid related.    As mentioned he has been abstinent from alcohol for about 1 month.  He recently purchased THC which he uses on occasion at night to boost his appetite.      History/Other:   Review of Systems  See above    Wt Readings from Last 7 Encounters:   06/19/24 187 lb (84.8 kg)   06/13/24 187 lb 6.4 oz (85 kg)   08/22/23 206 lb (93.4 kg)   06/12/23 206 lb (93.4 kg)   08/15/22 189 lb 3.2 oz (85.8 kg)   09/13/21 185 lb 6.4 oz (84.1 kg)   06/30/21 138 lb 14.2 oz (63 kg)         Current Outpatient Medications   Medication Sig Dispense Refill    levothyroxine 150 MCG Oral Tab Take one tablet everyday 120 tablet 1    gabapentin 300 MG Oral Cap Take 1 capsule (300 mg total) by mouth in the morning, at noon, and at bedtime. 90 capsule 1    hydrOXYzine Pamoate 50 MG Oral Cap Take 1 capsule (50 mg total) by mouth 4 (four) times daily as needed for Anxiety. 120 capsule 0    levETIRAcetam 500 MG Oral Tab TAKE 1 TABLET BY MOUTH IN THE MORNING AND 2 TABLETS BY MOUTH IN THE EVENING 270 tablet 3    ergocalciferol 1.25 MG (09543 UT) Oral Cap Take 1 capsule (50,000 Units total) by mouth once a week.      hydrOXYzine Pamoate 25 MG Oral Cap TAKE 1 TO 2 CAPSULES BY MOUTH FOUR TIMES DAILY UNTIL FUTHER NOTICE AS NEEDED      Cholecalciferol (VITAMIN D3) 1.25 MG (43934 UT) Oral Cap Take 50,000 Int'l Units/day by mouth once a week.      rosuvastatin 20 MG Oral Tab Take 1 tablet (20 mg total) by mouth nightly.      Levocetirizine Dihydrochloride (XYZAL) 5 MG Oral Tab Take 1 tablet (5 mg total) by mouth nightly. 30 tablet 2    thiamine 100 MG Oral Tab Take 1 tablet (100 mg total) by mouth daily. (Patient not taking: Reported on 8/8/2023)      Levothyroxine Sodium 112 MCG Oral Tab Take 1 tablet (112 mcg total) by mouth daily. Bottle  states: \"take 1.5 tabs daily\"       Allergies:  Allergies   Allergen Reactions    Amoxicillin HIVES and RASH     Other reaction(s): hives    Silver UNKNOWN    Adhesive Tape RASH     Other reaction(s): Rash    Other RASH     tegaderm    Tegaderm Hydrogel Wound Filler [Amerigel Wound Dressing] RASH       Objective:   Physical Exam  Vitals and nursing note reviewed.   Constitutional:       General: He is not in acute distress.     Appearance: He is well-developed. He is not ill-appearing, toxic-appearing or diaphoretic.   HENT:      Mouth/Throat:      Pharynx: No oropharyngeal exudate.   Eyes:      General: No scleral icterus.     Conjunctiva/sclera: Conjunctivae normal.   Neck:      Thyroid: No thyromegaly.   Cardiovascular:      Rate and Rhythm: Normal rate and regular rhythm.      Heart sounds: Normal heart sounds. No murmur heard.  Pulmonary:      Effort: Pulmonary effort is normal. No respiratory distress.      Breath sounds: Normal breath sounds. No wheezing or rales.   Abdominal:      General: Bowel sounds are normal. There is no distension.      Palpations: Abdomen is soft. There is no mass.      Tenderness: There is abdominal tenderness (Mild direct upper abdominal tenderness). There is no guarding or rebound.   Musculoskeletal:      Cervical back: Neck supple.   Lymphadenopathy:      Cervical: No cervical adenopathy.   Neurological:      Mental Status: He is alert and oriented to person, place, and time.   Psychiatric:      Comments: Anxious       Component      Latest Ref Rng 6/30/2021 9/23/2021 1/31/2022   WBC      4.0 - 11.0 x10(3) uL 7.9      RBC      4.30 - 5.70 x10(6)uL 4.68      Hemoglobin      13.0 - 17.5 g/dL 14.8      Hematocrit      39.0 - 53.0 % 44.8      MCV      80.0 - 100.0 fL 95.7      MCH      26.0 - 34.0 pg 31.6      MCHC      31.0 - 37.0 g/dL 33.0      RDW-SD      35.1 - 46.3 fL 47.3 (H)      RDW      11.0 - 15.0 % 13.3      Platelet Count      150.0 - 450.0 10(3)uL 234.0      Prelim  Neutrophil Abs      1.50 - 7.70 x10 (3) uL 5.11      Neutrophils Absolute      1.50 - 7.70 x10(3) uL 5.11      Lymphocytes Absolute      1.00 - 4.00 x10(3) uL 1.83      Monocytes Absolute      0.10 - 1.00 x10(3) uL 0.73      Eosinophils Absolute      0.00 - 0.70 x10(3) uL 0.21      Basophils Absolute      0.00 - 0.20 x10(3) uL 0.03      Immature Granulocyte Absolute      0.00 - 1.00 x10(3) uL 0.03      Neutrophils %      % 64.4      Lymphocytes %      % 23.0      Monocytes %      % 9.2      Eosinophils %      % 2.6      Basophils %      % 0.4      Immature Granulocyte %      % 0.4      Glucose      70 - 99 mg/dL 95      Sodium      136 - 145 mmol/L 140      Potassium      3.5 - 5.1 mmol/L 4.1      Chloride      98 - 112 mmol/L 105      Carbon Dioxide, Total      21.0 - 32.0 mmol/L 28.0      ANION GAP      0 - 18 mmol/L 7      BUN      7 - 18 mg/dL 8      CREATININE      0.70 - 1.30 mg/dL 1.03      BUN/CREATININE RATIO      10.0 - 20.0  7.8 (L)      CALCIUM      8.5 - 10.1 mg/dL 8.9      CALCULATED OSMOLALITY      275 - 295 mOsm/kg 288      eGFR NON-AFR. AMERICAN      >=60  100      eGFR       >=60  115      ALT (SGPT)      16 - 61 U/L 29      AST (SGOT)      15 - 37 U/L 37      ALKALINE PHOSPHATASE      45 - 117 U/L 49      Total Bilirubin      0.1 - 2.0 mg/dL 1.7      PROTEIN, TOTAL      6.4 - 8.2 g/dL 7.4      Albumin      3.4 - 5.0 g/dL 4.0      Globulin      2.8 - 4.4 g/dL 3.4      A/G Ratio      1.0 - 2.0  1.2      AMPHETAMINE URINE      Negative       BENZODIAZEPINES URINE      Negative       COCAINE URINE      Negative       PCP URINE      Negative       BARBITURATES URINE      Negative       CANNABINOID URINE      Negative       CREATININE UR RANDOM      mg/dL      OPIATE URINE      Negative       Ethyl Alcohol Urine, Qualitative      Negative       Creatinine, Urine      0.20 - 3.00 mg/mL      THC Creat Ratio      ng/mg creat      Drug Confirmation,Cannabinoids      Cutoff=25 ng/mL  1094      T4,Free (Direct)      0.8 - 1.7 ng/dL   0.7 (L)    TSH      0.358 - 3.740 mIU/mL   13.600 (H)    CALPROTECTIN, FECAL      <50 µg/g 35.3      Thyroglobulin, Serum or Plasma Reflex Bill   Billed    LEVETIRACETAM (KEPPRA)      10 - 40 ug/mL      Thyroglob Ab      0.0 - 0.9 IU/mL        Component      Latest Ref Rng 5/5/2022 5/9/2023   WBC      4.0 - 11.0 x10(3) uL     RBC      4.30 - 5.70 x10(6)uL     Hemoglobin      13.0 - 17.5 g/dL     Hematocrit      39.0 - 53.0 %     MCV      80.0 - 100.0 fL     MCH      26.0 - 34.0 pg     MCHC      31.0 - 37.0 g/dL     RDW-SD      35.1 - 46.3 fL     RDW      11.0 - 15.0 %     Platelet Count      150.0 - 450.0 10(3)uL     Prelim Neutrophil Abs      1.50 - 7.70 x10 (3) uL     Neutrophils Absolute      1.50 - 7.70 x10(3) uL     Lymphocytes Absolute      1.00 - 4.00 x10(3) uL     Monocytes Absolute      0.10 - 1.00 x10(3) uL     Eosinophils Absolute      0.00 - 0.70 x10(3) uL     Basophils Absolute      0.00 - 0.20 x10(3) uL     Immature Granulocyte Absolute      0.00 - 1.00 x10(3) uL     Neutrophils %      %     Lymphocytes %      %     Monocytes %      %     Eosinophils %      %     Basophils %      %     Immature Granulocyte %      %     Glucose      70 - 99 mg/dL     Sodium      136 - 145 mmol/L     Potassium      3.5 - 5.1 mmol/L     Chloride      98 - 112 mmol/L     Carbon Dioxide, Total      21.0 - 32.0 mmol/L     ANION GAP      0 - 18 mmol/L     BUN      7 - 18 mg/dL     CREATININE      0.70 - 1.30 mg/dL     BUN/CREATININE RATIO      10.0 - 20.0      CALCIUM      8.5 - 10.1 mg/dL     CALCULATED OSMOLALITY      275 - 295 mOsm/kg     eGFR NON-AFR. AMERICAN      >=60      eGFR       >=60      ALT (SGPT)      16 - 61 U/L     AST (SGOT)      15 - 37 U/L     ALKALINE PHOSPHATASE      45 - 117 U/L     Total Bilirubin      0.1 - 2.0 mg/dL     PROTEIN, TOTAL      6.4 - 8.2 g/dL     Albumin      3.4 - 5.0 g/dL     Globulin      2.8 - 4.4 g/dL     A/G Ratio      1.0 -  2.0      AMPHETAMINE URINE      Negative   Negative    BENZODIAZEPINES URINE      Negative   Negative    COCAINE URINE      Negative   Negative    PCP URINE      Negative   Negative    BARBITURATES URINE      Negative   Negative    CANNABINOID URINE      Negative   Presumed Positive !    CREATININE UR RANDOM      mg/dL  185.00    OPIATE URINE      Negative   Negative    Ethyl Alcohol Urine, Qualitative      Negative   Negative    Creatinine, Urine      0.20 - 3.00 mg/mL  1.73    THC Creat Ratio      ng/mg creat  >173    Drug Confirmation,Cannabinoids      Cutoff=25 ng/mL  >300    T4,Free (Direct)      0.8 - 1.7 ng/dL     TSH      0.358 - 3.740 mIU/mL     CALPROTECTIN, FECAL      <50 µg/g     Thyroglobulin, Serum or Plasma Reflex Bill     LEVETIRACETAM (KEPPRA)      10 - 40 ug/mL 12     Thyroglob Ab      0.0 - 0.9 IU/mL       Component      Latest Ref Rn 7/10/2023   WBC      4.0 - 11.0 x10(3) uL    RBC      4.30 - 5.70 x10(6)uL    Hemoglobin      13.0 - 17.5 g/dL    Hematocrit      39.0 - 53.0 %    MCV      80.0 - 100.0 fL    MCH      26.0 - 34.0 pg    MCHC      31.0 - 37.0 g/dL    RDW-SD      35.1 - 46.3 fL    RDW      11.0 - 15.0 %    Platelet Count      150.0 - 450.0 10(3)uL    Prelim Neutrophil Abs      1.50 - 7.70 x10 (3) uL    Neutrophils Absolute      1.50 - 7.70 x10(3) uL    Lymphocytes Absolute      1.00 - 4.00 x10(3) uL    Monocytes Absolute      0.10 - 1.00 x10(3) uL    Eosinophils Absolute      0.00 - 0.70 x10(3) uL    Basophils Absolute      0.00 - 0.20 x10(3) uL    Immature Granulocyte Absolute      0.00 - 1.00 x10(3) uL    Neutrophils %      %    Lymphocytes %      %    Monocytes %      %    Eosinophils %      %    Basophils %      %    Immature Granulocyte %      %    Glucose      70 - 99 mg/dL    Sodium      136 - 145 mmol/L    Potassium      3.5 - 5.1 mmol/L    Chloride      98 - 112 mmol/L    Carbon Dioxide, Total      21.0 - 32.0 mmol/L    ANION GAP      0 - 18 mmol/L    BUN      7 - 18 mg/dL     CREATININE      0.70 - 1.30 mg/dL    BUN/CREATININE RATIO      10.0 - 20.0     CALCIUM      8.5 - 10.1 mg/dL    CALCULATED OSMOLALITY      275 - 295 mOsm/kg    eGFR NON-AFR. AMERICAN      >=60     eGFR       >=60     ALT (SGPT)      16 - 61 U/L    AST (SGOT)      15 - 37 U/L    ALKALINE PHOSPHATASE      45 - 117 U/L    Total Bilirubin      0.1 - 2.0 mg/dL    PROTEIN, TOTAL      6.4 - 8.2 g/dL    Albumin      3.4 - 5.0 g/dL    Globulin      2.8 - 4.4 g/dL    A/G Ratio      1.0 - 2.0     AMPHETAMINE URINE      Negative     BENZODIAZEPINES URINE      Negative     COCAINE URINE      Negative     PCP URINE      Negative     BARBITURATES URINE      Negative     CANNABINOID URINE      Negative     CREATININE UR RANDOM      mg/dL    OPIATE URINE      Negative     Ethyl Alcohol Urine, Qualitative      Negative     Creatinine, Urine      0.20 - 3.00 mg/mL    THC Creat Ratio      ng/mg creat    Drug Confirmation,Cannabinoids      Cutoff=25 ng/mL    T4,Free (Direct)      0.8 - 1.7 ng/dL 1.2    TSH      0.358 - 3.740 mIU/mL 1.080    CALPROTECTIN, FECAL      <50 µg/g    Thyroglobulin, Serum or Plasma Reflex Bill    LEVETIRACETAM (KEPPRA)      10 - 40 ug/mL    Thyroglob Ab      0.0 - 0.9 IU/mL <1.0       Legend:  (H) High  (L) Low  ! Abnormal      Assessment & Plan:   1. Weight loss    2. Nausea    The patient has ongoing abdominal symptoms associated with a documented weight loss.  I suspect that the patient's symptoms are due to anxiety and substance abuse.  Other structural lesions including aspirin induced ulcer disease, inflammatory bowel disease or neoplastic disease should be excluded.  I have recommended that the patient avoid excessive cannabis use which can cause episodes of nausea and vomiting.  Anxiety should be controlled and the patient should maintain sobriety.  I have discussed diagnostic options including upper endoscopy and/or a CT scan of the abdomen and pelvis.  We have discussed that both  tests may be necessary.  We have elected to proceed initially with a CT scan of the abdomen and pelvis.  Further recommendations pending this result.  I have also recommended that the patient keep a diary with regards to his diet and symptoms.        Meds This Visit:  Requested Prescriptions      No prescriptions requested or ordered in this encounter       Imaging & Referrals:  CT ABDOMEN+PELVIS(CONTRAST ONLY)(CPT=74177)

## 2024-06-19 NOTE — PATIENT INSTRUCTIONS
1.  Keep a diary with regards to diet and symptoms.  2.  Schedule CT scan of the abdomen and pelvis.  3.  Further recommendations pending the above results.

## 2024-06-22 PROBLEM — R53.83 FATIGUE: Status: ACTIVE | Noted: 2024-06-22

## 2024-06-22 RX ORDER — LEVOTHYROXINE SODIUM 0.15 MG/1
TABLET ORAL
Qty: 120 TABLET | Refills: 1 | Status: SHIPPED | OUTPATIENT
Start: 2024-06-22

## 2024-07-01 DIAGNOSIS — R56.9 SEIZURE (HCC): ICD-10-CM

## 2024-07-01 DIAGNOSIS — E89.0 POSTOPERATIVE HYPOTHYROIDISM: ICD-10-CM

## 2024-07-01 RX ORDER — LEVOTHYROXINE SODIUM 150 UG/1
TABLET ORAL
Qty: 120 TABLET | Refills: 1 | Status: CANCELLED | OUTPATIENT
Start: 2024-07-01

## 2024-07-01 RX ORDER — LEVETIRACETAM 500 MG/1
TABLET ORAL
Qty: 270 TABLET | Refills: 3 | Status: SHIPPED | OUTPATIENT
Start: 2024-07-01

## 2024-07-01 NOTE — TELEPHONE ENCOUNTER
Endocrine refill protocol for medications for hypothyroidism and hyperthyroidism    Protocol Criteria:  Appointment with Endocrinology completed in the last 12 months or scheduled in the next 6 months     Verify appointment has been completed or scheduled in the appropriate timeline. If so can send a 90 day supply with 1 refill per provider protocol.    Normal TSH result in the past 12 months   Review recent telephone encounters and mychart communications with patient to ensure a dose change has not occurred since last office visit that was not updated in the medication history list   Last completed office visit:6/13/2024 Eugenia Mancilla MD   Next scheduled Follow up:   Future Appointments   Date Time Provider Department Center   7/18/2024  9:00 AM [facility] CT RM1 CF CT SCAN [facility]      Last TSH result:   TSH   Date Value Ref Range Status   05/16/2024 0.029 (L) 0.550 - 4.780 mIU/mL Final     TSH (S)   Date Value Ref Range Status   06/04/2015 1.92 0.34 - 5.60 uIU/mL Final         Refill was just refilled on 6/22/2024

## 2024-07-01 NOTE — TELEPHONE ENCOUNTER
Requested Prescriptions     Pending Prescriptions Disp Refills    levETIRAcetam 500 MG Oral Tab 270 tablet 3     Sig: TAKE 1 TABLET BY MOUTH IN THE MORNING AND 2 TABLETS BY MOUTH IN THE EVENING      Last OV: 8/22/2023 with a return in about 1 year (around 8/22/2024).  Next OV: None    [place]/;

## 2024-07-18 ENCOUNTER — HOSPITAL ENCOUNTER (OUTPATIENT)
Dept: CT IMAGING | Facility: HOSPITAL | Age: 29
Discharge: HOME | End: 2024-07-18
Attending: INTERNAL MEDICINE
Payer: COMMERCIAL

## 2024-07-18 DIAGNOSIS — R63.4 WEIGHT LOSS: ICD-10-CM

## 2024-07-18 LAB
CREAT BLD-MCNC: 1 MG/DL
EGFRCR SERPLBLD CKD-EPI 2021: 104 ML/MIN/1.73M2 (ref 60–?)

## 2024-07-18 PROCEDURE — 74177 CT ABD & PELVIS W/CONTRAST: CPT | Performed by: INTERNAL MEDICINE

## 2024-07-18 PROCEDURE — 82565 ASSAY OF CREATININE: CPT

## 2024-07-18 RX ORDER — ONDANSETRON 4 MG/1
4 TABLET, ORALLY DISINTEGRATING ORAL EVERY 8 HOURS PRN
Qty: 30 TABLET | Refills: 1 | Status: SHIPPED | OUTPATIENT
Start: 2024-07-18

## 2024-08-23 ENCOUNTER — APPOINTMENT (OUTPATIENT)
Dept: ULTRASOUND IMAGING | Age: 29
End: 2024-08-23
Attending: PHYSICIAN ASSISTANT
Payer: COMMERCIAL

## 2024-08-23 ENCOUNTER — HOSPITAL ENCOUNTER (OUTPATIENT)
Age: 29
Discharge: HOME | End: 2024-08-23
Payer: COMMERCIAL

## 2024-08-23 VITALS
DIASTOLIC BLOOD PRESSURE: 85 MMHG | HEART RATE: 104 BPM | RESPIRATION RATE: 16 BRPM | SYSTOLIC BLOOD PRESSURE: 132 MMHG | TEMPERATURE: 98 F | OXYGEN SATURATION: 98 %

## 2024-08-23 DIAGNOSIS — N49.2 CELLULITIS OF SCROTUM: Primary | ICD-10-CM

## 2024-08-23 LAB
#MXD IC: 0.4 X10ˆ3/UL (ref 0.1–1)
BUN BLD-MCNC: 5 MG/DL (ref 7–18)
CHLORIDE BLD-SCNC: 98 MMOL/L (ref 98–112)
CLARITY UR: CLEAR
CO2 BLD-SCNC: 27 MMOL/L (ref 21–32)
CREAT BLD-MCNC: 0.9 MG/DL
EGFRCR SERPLBLD CKD-EPI 2021: 119 ML/MIN/1.73M2 (ref 60–?)
GLUCOSE BLD-MCNC: 128 MG/DL (ref 70–99)
GLUCOSE UR STRIP-MCNC: NEGATIVE MG/DL
HCT VFR BLD AUTO: 48.2 %
HCT VFR BLD CALC: 52 %
HGB BLD-MCNC: 16.6 G/DL
HGB UR QL STRIP: NEGATIVE
ISTAT IONIZED CALCIUM FOR CHEM 8: 1.19 MMOL/L (ref 1.12–1.32)
KETONES UR STRIP-MCNC: 15 MG/DL
LEUKOCYTE ESTERASE UR QL STRIP: NEGATIVE
LYMPHOCYTES # BLD AUTO: 2.2 X10ˆ3/UL (ref 1–4)
LYMPHOCYTES NFR BLD AUTO: 29.9 %
MCH RBC QN AUTO: 30.2 PG (ref 26–34)
MCHC RBC AUTO-ENTMCNC: 34.4 G/DL (ref 31–37)
MCV RBC AUTO: 87.6 FL (ref 80–100)
MIXED CELL %: 6.1 %
NEUTROPHILS # BLD AUTO: 4.6 X10ˆ3/UL (ref 1.5–7.7)
NEUTROPHILS NFR BLD AUTO: 64 %
NITRITE UR QL STRIP: NEGATIVE
PH UR STRIP: 6 [PH]
PLATELET # BLD AUTO: 355 X10ˆ3/UL (ref 150–450)
POTASSIUM BLD-SCNC: 3.9 MMOL/L (ref 3.6–5.1)
PROT UR STRIP-MCNC: 100 MG/DL
RBC # BLD AUTO: 5.5 X10ˆ6/UL
SODIUM BLD-SCNC: 139 MMOL/L (ref 136–145)
SP GR UR STRIP: 1.02
UROBILINOGEN UR STRIP-ACNC: <2 MG/DL
WBC # BLD AUTO: 7.2 X10ˆ3/UL (ref 4–11)

## 2024-08-23 PROCEDURE — 87591 N.GONORRHOEAE DNA AMP PROB: CPT | Performed by: PHYSICIAN ASSISTANT

## 2024-08-23 PROCEDURE — 80047 BASIC METABLC PNL IONIZED CA: CPT

## 2024-08-23 PROCEDURE — 81002 URINALYSIS NONAUTO W/O SCOPE: CPT

## 2024-08-23 PROCEDURE — 99214 OFFICE O/P EST MOD 30 MIN: CPT

## 2024-08-23 PROCEDURE — 36415 COLL VENOUS BLD VENIPUNCTURE: CPT

## 2024-08-23 PROCEDURE — 76870 US EXAM SCROTUM: CPT | Performed by: PHYSICIAN ASSISTANT

## 2024-08-23 PROCEDURE — 99204 OFFICE O/P NEW MOD 45 MIN: CPT

## 2024-08-23 PROCEDURE — 85025 COMPLETE CBC W/AUTO DIFF WBC: CPT | Performed by: PHYSICIAN ASSISTANT

## 2024-08-23 PROCEDURE — 93975 VASCULAR STUDY: CPT | Performed by: PHYSICIAN ASSISTANT

## 2024-08-23 PROCEDURE — 87491 CHLMYD TRACH DNA AMP PROBE: CPT | Performed by: PHYSICIAN ASSISTANT

## 2024-08-23 RX ORDER — SULFAMETHOXAZOLE/TRIMETHOPRIM 800-160 MG
1 TABLET ORAL 2 TIMES DAILY
Qty: 14 TABLET | Refills: 0 | Status: SHIPPED | OUTPATIENT
Start: 2024-08-23 | End: 2024-08-30

## 2024-08-23 RX ORDER — CLOTRIMAZOLE 1 %
1 CREAM (GRAM) TOPICAL 2 TIMES DAILY
Qty: 28 G | Refills: 0 | Status: SHIPPED | OUTPATIENT
Start: 2024-08-23 | End: 2024-09-02

## 2024-08-23 NOTE — ED INITIAL ASSESSMENT (HPI)
Patient with discomfort and tenderness to his penis and testicles  Concerned also about recent weight loss and abdominal discomfort  Right testicle is painful and swollen     No recent sexual activity

## 2024-08-23 NOTE — ED PROVIDER NOTES
Patient Seen in: [facility] [place]    History     Chief Complaint   Patient presents with    Eval-G     Stated Complaint: Eval-G    ZAINAB Avina is a 29 year old male who presents with chief complaint of right testicle pain.  Onset yesterday.  Patient reports associated swelling and erythema of right testicle.  Patient denies injury or trauma.  Patient denies fever, chills, nausea, vomiting, diarrhea, constipation, melena, hematochezia, dysuria, hematuria, flank pain, penile lesions, penile discharge.        Past Medical History:    Blurry vision    Congenital scoliosis    Esophageal reflux    Fracture, finger    fx left 4th finger - surgical repair    Hypothyroidism    Thyroid removed d/t CA 2011    Myopia with astigmatism    OU    Seizure disorder (HCC)    throid cancer age 16    Thyroid carcinoma (HCC)    thyroidectomy    Thyroid carcinoma (HCC)       Past Surgical History:   Procedure Laterality Date    Hand/finger surgery unlisted      Thyroidectomy  2011            Family History   Problem Relation Age of Onset    Glaucoma Father     Lipids Father         hyperlipidemia    Melanoma Father     Other (hyperlipidemia) Father     Hypertension Mother     Ovarian Cancer Maternal Grandmother     Other (alzheimer's disease) Paternal Grandmother     Other (Myocardial infarction) Paternal Grandfather     Depression Sister     Lung Disorder Sister         exercise induced bronchitis    Depression Brother     Anxiety Brother        Social History     Socioeconomic History    Marital status: Single   Tobacco Use    Smoking status: Never    Smokeless tobacco: Current    Tobacco comments:     8-10 pouches per day   Vaping Use    Vaping status: Every Day    Substances: THC    Devices: Pre-filled or refillable cartridge   Substance and Sexual Activity    Alcohol use: Yes     Comment: Last drink 8/01    Drug use: Yes     Frequency: 7.0 times per week     Types: Cannabis, Amphetamines     Comment: Vape about 5  times per week (\"4 puffs per night\"). History of Adderall use.   Other Topics Concern    Pt has a pacemaker No    Pt has a defibrillator No    Reaction to local anesthetic No    Caffeine Concern Yes       Review of Systems    Positive for stated complaint: Eval-G  Other systems are as noted in HPI.  Constitutional and vital signs reviewed.      All other systems reviewed and negative except as noted above.    PSFH elements reviewed from today and agreed except as otherwise stated in HPI.    Physical Exam     ED Triage Vitals [08/23/24 1449]   /85   Pulse 115   Resp 16   Temp 98 °F (36.7 °C)   Temp src Temporal   SpO2 98 %   O2 Device None (Room air)       Current:/85   Pulse 104   Temp 98 °F (36.7 °C) (Temporal)   Resp 16   SpO2 98%     PULSE OX within normal limits on room air as interpreted by this provider.        Physical Exam    Constitutional: The patient is cooperative. Appears well-developed and well-nourished.  Mild discomfort.  Psychological: Alert, No abnormalities of mood, affect.  Head: Normocephalic/atraumatic.  Eyes: Pupils are equal round reactive to light.  Conjunctiva are within normal limits.  ENT: Oropharynx is clear.  Mucous membranes moist.  Neck: The neck is supple.  No meningeal signs.  Chest: There is no tenderness to the chest wall.  No CVA tenderness bilaterally.  Respiratory: Respiratory effort was normal.  There is no rales, wheezes, or rhonchi.  There is no stridor.  Air entry is equal.  Cardiovascular: Tachycardic, regular rhythm.  Capillary refill is brisk.    Gastrointestinal: Abdomen soft, nontender, nondistended.  There is no rebound tenderness or guarding.  No organomegaly is noted. No peritoneal signs.  Normal bowel sounds.  No McBurney point tenderness.  Negative Demarco sign.  Genitourinary: No penile lesions or discharge.  Cremasteric reflex intact bilaterally.  Positive tenderness to palpation of right testicle.  Positive overlying erythema and mild edema of  bilateral scrotum.  No open wound, erythematous tracking, purulent drainage, induration or fluctuance.  Lymphatic: No gross lymphadenopathy noted.  Musculoskeletal: Musculoskeletal system is grossly intact.  There is no obvious deformity.  Neurological: Gross motor movement is intact in all 4 extremities.  Patient exhibits normal speech.  Skin: Skin is normal to inspection, except as documented.  No obvious bruising.            ED Course     Labs Reviewed   POCT ISTAT CHEM8 CARTRIDGE - Abnormal; Notable for the following components:       Result Value    ISTAT BUN 5 (*)     ISTAT Glucose 128 (*)     All other components within normal limits   [facility] POCT URINALYSIS DIPSTICK - Abnormal; Notable for the following components:    Protein urine 100 (*)     Ketone, Urine 15 (*)     Bilirubin, Urine Small (*)     All other components within normal limits   POCT CBC   CHLAMYDIA/GONOCOCCUS, SILVIA       MDM             Radiology Interpretation:    US SCROTUM W/ DOPPLER (CPT=93975/65176) (Final result)  Result time 08/23/24 15:42:19  Final result by Miguel Toledo MD (08/23/24 15:42:19)                Narrative:    PROCEDURE: US SCROTUM W/DOPPLER (CPT=93975/61177)     COMPARISON: None.     INDICATIONS: Bilateral testicular tenderness, swelling and redness x 2days. R>L     TECHNIQUE: The scrotum was evaluated with gray scale and color duplex Doppler sonography.     FINDINGS:     RIGHT  TESTICLE: 4.3 x 2.3 x 3.0 cm.  Normal echogenicity.  No masses.    EPIDIDYMIS: Normal size and echogenicity.    OTHER: Negative.  No varicocele or hydrocele.    DOPPLER: Normal arterial and venous flow in the testicle with color and pulsed Doppler.  Normal epididymal flow.       LEFT  TESTICLE: 4.3 x 2.2 x 3 point cm.  Normal echogenicity.  No masses.    EPIDIDYMIS: Normal size and echogenicity.  Punctate calcification in the epididymis  OTHER: Negative.  No varicocele or hydrocele.    DOPPLER: Normal arterial and venous flow in the testicle  with color pulsed Doppler.  Normal epididymal flow.       CONCLUSION: No testicular mass.  Arterial and venous flow are present bilaterally  Finalized by (CST): Ivan Toledo MD on 8/23/2024 at 3:42 PM                  Medical Decision Making  Differential diagnosis prior to work-up including but not limited to biliary colic, pancreatitis, gastritis, enteritis, colitis, diverticulitis, appendicitis, perforated viscus, bowel obstruction, UTI, urolithiasis, cellulitis, orchitis, epididymitis    Problems Addressed:  Cellulitis of scrotum: acute illness or injury    Amount and/or Complexity of Data Reviewed  Labs: ordered. Decision-making details documented in ED Course.  Radiology: ordered. Decision-making details documented in ED Course.    Risk  Prescription drug management.      Physical exam remained stable as previously documented.  Available results reviewed with patient.    I have given the patient instructions regarding their diagnoses, expectations, follow up, and ER precautions. I explained to the patient that emergent conditions may arise and to go to the ER for new, worsening or any persistent conditions. I've explained the importance of following up with their doctor as instructed. The patient verbalized understanding of the discharge instructions and plan.    Patient case discussed with Dr. Hinton.      Disposition and Plan     Clinical Impression:  1. Cellulitis of scrotum        Disposition:  Discharge    Follow-up:  Yovany Mcduffie MD  [address]  [address]  [facility] [id number] [phone]    Call in 1 day  For follow-up    Telma Truong MD  [address]  [place] [id number] [phone]    Call in 1 day  For follow-up      Medications Prescribed:  Discharge Medication List as of 8/23/2024  3:51 PM        START taking these medications    Details   sulfamethoxazole-trimethoprim -160 MG Oral Tab per tablet Take 1 tablet by mouth 2 (two) times daily for 7 days., Normal, Disp-14  tablet, R-0      clotrimazole 1 % External Cream Apply 1 Application topically 2 (two) times daily for 10 days., Normal, Disp-28 g, R-0

## 2024-08-26 LAB
C TRACH DNA SPEC QL NAA+PROBE: NEGATIVE
N GONORRHOEA DNA SPEC QL NAA+PROBE: NEGATIVE

## 2024-09-03 ENCOUNTER — OFFICE VISIT (OUTPATIENT)
Dept: SURGERY | Facility: CLINIC | Age: 29
End: 2024-09-03
Payer: COMMERCIAL

## 2024-09-03 DIAGNOSIS — N50.811 PAIN IN RIGHT TESTICLE: Primary | ICD-10-CM

## 2024-09-03 PROCEDURE — 99244 OFF/OP CNSLTJ NEW/EST MOD 40: CPT | Performed by: UROLOGY

## 2024-09-03 NOTE — PROGRESS NOTES
Deanne Lorenzo MD  Department of Urology  [address]  [place] [address]    T: [phone]  F: [phone]    To: CAROLIN BOWLES MD   [address] [place] [address] [address]  [facility] [address]    Re: Bakari Avina   MRN: [id number]  : 1995    Dear CAROLIN BOWLES MD,    Today I had the pleasure of seeing Bakari Avina in my clinic. As you know, Mr. Avina is a pleasant 29 year old year old male who I am seeing for testicular pain. Patient was last seen in this department on Visit date not found.    Briefly, patient recently went to the urgent care on 2024 with discomfort to his right testicle.  In the urgent care gonorrhea and chlamydia were negative, urine analysis was negative for infection, creatinine was unremarkable.  Ultrasound demonstrated no testicular mass, there was appropriate bilateral blood flow to both testicles.    He states that he mainly has what sounds like perineal pain, urgency frequency pain in the pelvis. 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 He does have some issues with anxiety and notices that he tenses up.           PAST MEDICAL HISTORY:  Past Medical History:    Blurry vision    Congenital scoliosis    Esophageal reflux    Fracture, finger    fx left 4th finger - surgical repair    Hypothyroidism    Thyroid removed d/t CA     Myopia with astigmatism    OU    Seizure disorder (HCC)    throid cancer age 16    Thyroid carcinoma (HCC)    thyroidectomy    Thyroid carcinoma (HCC)        PAST SURGICAL HISTORY:  Past Surgical History:   Procedure Laterality Date    Hand/finger surgery unlisted      Thyroidectomy           ALLERGIES:  Allergies   Allergen Reactions    Amoxicillin HIVES and RASH     Other reaction(s): hives    Silver UNKNOWN    Adhesive Tape RASH     Other reaction(s): Rash    Other RASH     tegaderm    Tegaderm Hydrogel Wound Filler [Amerigel Wound Dressing] RASH         MEDICATIONS:  Current Outpatient Medications    Medication Instructions    ergocalciferol (VITAMIN D2) 50,000 Units, Oral, Weekly    gabapentin (NEURONTIN) 300 mg, Oral, 3 times daily    hydrOXYzine Pamoate (VISTARIL) 50 mg, Oral, 4 times daily PRN    hydrOXYzine Pamoate 25 MG Oral Cap TAKE 1 TO 2 CAPSULES BY MOUTH FOUR TIMES DAILY UNTIL FUTHER NOTICE AS NEEDED    levETIRAcetam 500 MG Oral Tab TAKE 1 TABLET BY MOUTH IN THE MORNING AND 2 TABLETS BY MOUTH IN THE EVENING    levocetirizine (XYZAL) 5 mg, Oral, Nightly    levothyroxine (SYNTHROID) 112 mcg, Oral, Daily, Bottle states: \"take 1.5 tabs daily\"    levothyroxine 150 MCG Oral Tab Take one tablet everyday    ondansetron (ZOFRAN-ODT) 4 mg, Oral, Every 8 hours PRN    rosuvastatin (CRESTOR) 20 mg, Oral, Nightly    thiamine (VITAMIN B1) 100 mg, Daily    Vitamin D3 50,000 Int'l Units/day, Oral, Weekly        FAMILY HISTORY:  Family History   Problem Relation Age of Onset    Glaucoma Father     Lipids Father         hyperlipidemia    Melanoma Father     Other (hyperlipidemia) Father     Hypertension Mother     Ovarian Cancer Maternal Grandmother     Other (alzheimer's disease) Paternal Grandmother     Other (Myocardial infarction) Paternal Grandfather     Depression Sister     Lung Disorder Sister         exercise induced bronchitis    Depression Brother     Anxiety Brother         SOCIAL HISTORY:  Social History     Socioeconomic History    Marital status: Single   Tobacco Use    Smoking status: Never    Smokeless tobacco: Current    Tobacco comments:     8-10 pouches per day   Vaping Use    Vaping status: Every Day    Substances: THC    Devices: Pre-filled or refillable cartridge   Substance and Sexual Activity    Alcohol use: Yes     Comment: Last drink 8/01    Drug use: Yes     Frequency: 7.0 times per week     Types: Cannabis, Amphetamines     Comment: Vape about 5 times per week (\"4 puffs per night\"). History of Adderall use.   Other Topics Concern    Pt has a pacemaker No    Pt has a defibrillator No     Reaction to local anesthetic No    Caffeine Concern Yes          PHYSICAL EXAMINATION:  There were no vitals filed for this visit.  CONSTITUTIONAL: No apparent distress, cooperative and communicative  NEUROLOGIC: Alert and oriented   HEAD: Normocephalic, atraumatic   EYES: Sclera non-icteric   ENT: Hearing intact, moist mucous membranes   NECK: No obvious goiter or masses   RESPIRATORY: Normal respiratory effort, Nonlabored breathing on room air  SKIN: No evident rashes   ABDOMEN: Soft, nontender, nondistended, no rebound tenderness, no guarding, no masses  GENITOURINARY: circumcised phallus, orthotopic meatus, no glans or shaft lesions, bilateral testicles descended in scrotum without any masses or pain to palpation      REVIEW OF SYSTEMS:    A comprehensive 10-point review of systems was completed.  Pertinent positives and negatives are noted in the the HPI.       LABORATORY DATA:       Component  Ref Range & Units  Resulting Agency   Chlamydia Trachomatis Amplified RNA  Negative Negative [facility] ([facility])   Neisseria Gonorrhoeae Amplified RNA  Negative Negative [facility] ([facility])   Chlam/GC Source Urine [place] ([facility])            Component  Ref Range & Units 8/23/24  3:45 PM   Urine Color  Yellow Dark yellow   Urine Clarity  Clear Clear   Specific Gravity, Urine  1.005 - 1.030 1.025   PH, Urine  5.0 - 8.0 6.0   Protein urine  Negative mg/dL 100 Abnormal    Glucose, Urine  Negative mg/dL Negative   Ketone, Urine  Negative mg/dL 15 Abnormal    Bilirubin, Urine  Negative Small Abnormal    Blood, Urine  Negative Negative   Nitrite Urine  Negative Negative   Urobilinogen urine  <2.0 mg/dL <2.0   Leukocyte esterase urine  Negative Negative        ISTAT Sodium  136 - 145 mmol/L 139   ISTAT BUN  7 - 18 mg/dL 5 Low    ISTAT Potassium  3.6 - 5.1 mmol/L 3.9   ISTAT Chloride  98 - 112 mmol/L 98   ISTAT Ionized Calcium  1.12 - 1.32 mmol/L 1.19   ISTAT Hematocrit  37 - 53 % 52   ISTAT Glucose  70 - 99 mg/dL 128 High    ISTAT  TCO2  21 - 32 mmol/L 27   ISTAT Creatinine  0.70 - 1.30 mg/dL 0.90        WBC IC  4.0 - 11.0 x10ˆ3/uL 7.2   RBC IC  4.30 - 5.70 X10ˆ6/uL 5.50   HGB IC  13.0 - 17.5 g/dL 16.6   HCT IC  39.0 - 53.0 % 48.2   MCV IC  80.0 - 100.0 fL 87.6   MCH IC  26.0 - 34.0 pg 30.2   MCHC IC  31.0 - 37.0 g/dL 34.4   PLT IC  150.0 - 450.0 X10ˆ3/uL 355.0   # Neutrophil  1.5 - 7.7 X10ˆ3/uL 4.6   # Lymphocyte  1.0 - 4.0 X10ˆ3/uL 2.2   # Mixed Cells  0.1 - 1.0 X10ˆ3/uL 0.4   Neutrophil %  % 64.0   Lymphocyte %  % 29.9   Mixed Cell %  % 6.1        IMAGING REVIEW:  Narrative   PROCEDURE: US SCROTUM W/DOPPLER (CPT=93975/99706)     COMPARISON: None.     INDICATIONS: Bilateral testicular tenderness, swelling and redness x 2days. R>L     TECHNIQUE: The scrotum was evaluated with gray scale and color duplex Doppler sonography.     FINDINGS:     RIGHT  TESTICLE: 4.3 x 2.3 x 3.0 cm.  Normal echogenicity.  No masses.    EPIDIDYMIS: Normal size and echogenicity.    OTHER: Negative.  No varicocele or hydrocele.    DOPPLER: Normal arterial and venous flow in the testicle with color and pulsed Doppler.  Normal epididymal flow.       LEFT  TESTICLE: 4.3 x 2.2 x 3 point cm.  Normal echogenicity.  No masses.    EPIDIDYMIS: Normal size and echogenicity.  Punctate calcification in the epididymis  OTHER: Negative.  No varicocele or hydrocele.    DOPPLER: Normal arterial and venous flow in the testicle with color pulsed Doppler.  Normal epididymal flow.       CONCLUSION: No testicular mass.  Arterial and venous flow are present bilaterally  Finalized by (CST): Ivan Toledo MD on 8/23/2024 at 3:42 PM                   OTHER RELEVANT DATA:   none     IMPRESSION: testicular pain without any worrisome findings on exam or ultrasound and labs. He possibly has pelvic floor dysfunction. Recommend behavioral management as below and if any acute changes go to ER or return to clinic. I did offer tamsulosin or testing, but he wants to hold off for now.    Behavioral  management includes timed voiding, double voiding, avoiding bladder irritants (coffee, tea, soda, alcohol), sitz baths, meditation 10 minutes every day, constipation avoidance, voiding prior to bedtime, avoiding fluids 2 to 4 hours before bedtime, compression stockings.  Scrotal elevation and scrotal rest, ice, NSAIDs.    Tamsulosin will help relax the pelvic floor muscles.  It should be taken at night as it can cause some dizziness.  It also can cause decreased ejaculate volume and should be stopped if he is trying to conceive.     He should give this 2 to 3 months to have an effect.  If additional benefit is needed can consider pelvic floor physical therapy.  Can also consider diagnostic testing with cystoscopy and transrectal ultrasound.  Rectal suppositories with Valium and baclofen may be helpful in the future.           PLAN:  Behavioral management give it 6-8 weeks to start working  Return to clinic as needed    Thank you for referring this very pleasant patient to my clinic. If you have any questions or concerns, please do not hesitate to contact me.    Sincerely,  Deanne Lorenzo MD    30 minutes were spent on this patient at this visit obtaining a history, reviewing medical records, developing a treatment plan, counseling and discussing treatment strategy with patient, coordination of care and documentation.     The 21st Century Cures Act makes medical notes available to patients in the interest of transparency.  However, please be advised that this is a medical document.  It is intended as a peer to peer communication.  It is written in medical language and may contain abbreviations or verbiage that are technical and unfamiliar.  It may appear blunt or direct.  Medical documents are intended to carry relevant information, facts as evident, and the clinical opinion of the practitioner.

## 2024-09-23 ENCOUNTER — PATIENT MESSAGE (OUTPATIENT)
Dept: ENDOCRINOLOGY CLINIC | Facility: CLINIC | Age: 29
End: 2024-09-23

## 2024-09-23 DIAGNOSIS — R53.83 FATIGUE, UNSPECIFIED TYPE: Primary | ICD-10-CM

## 2024-09-24 NOTE — TELEPHONE ENCOUNTER
From: Bakari Avina  To: Eugenia Mancilla  Sent: 9/23/2024 6:24 PM CDT  Subject: Testosterone Blood Test    Hi Dr. Mancilla,     Out of curiosity, what are your thoughts about TRT for someone my age (29)?    While my mental and spiritual healths continue to improve, my social and physical realms continue to lag. I'm curious if testosterone could be a helpful supplement for energy and motivation, though I have seen it can act as an appetite suppressant.     If willing, might you put in a lab for a blood test? If my levels are quite low, perhaps we can discuss options for next steps.    Please let me know.  Thanks,  Bakari

## 2024-09-29 NOTE — TELEPHONE ENCOUNTER
Hi!  Please let patient know that I can definitely check a testosterone level. He should have this done fasting and in the morning before 9AM. I would like him also have his thyroid US completed and make an appt to see me. Thank you!

## 2024-10-01 ENCOUNTER — TELEPHONE (OUTPATIENT)
Dept: SURGERY | Facility: CLINIC | Age: 29
End: 2024-10-01

## 2024-10-01 ENCOUNTER — TELEPHONE (OUTPATIENT)
Facility: CLINIC | Age: 29
End: 2024-10-01

## 2024-10-01 DIAGNOSIS — R11.2 NAUSEA AND VOMITING, UNSPECIFIED VOMITING TYPE: Primary | ICD-10-CM

## 2024-10-01 DIAGNOSIS — R35.0 URINARY FREQUENCY: Primary | ICD-10-CM

## 2024-10-01 RX ORDER — OMEGA-3 FATTY ACIDS/FISH OIL 300-1000MG
3 CAPSULE ORAL NIGHTLY
COMMUNITY

## 2024-10-01 NOTE — TELEPHONE ENCOUNTER
Patient calling stating he is experiencing bloating,nauseous and frequent urination. Patient would like to get his prostate check.Please advise

## 2024-10-01 NOTE — TELEPHONE ENCOUNTER
Dr Davis  Called patient due to message below, date of birth and name verified. He stated he is experiencing continuous nausea with occasional vomiting, worse after every meal which is impacting his appetite in spite of taking ondansetron.  He has not consumed alcohol but uses synthetic marijuana 4-5 times daily .  Advised to refrain from using cannabis and alcohol and if unable to tolerate oral intake, he should go to ER. He agreed and verbalized understanding.  He stated he is considering to stop using marijuana, has been using synthetic.  He would like to schedule for an upper endoscopy.  Last office visit 6/19/2024  Medications, pharmacy, and allergies reviewed.   Please advise on colonoscopy and bowel prep orders.     › Insurance:  [employer] [place]PO  › Last pcp Visit: May or June 2024  › Last CBC/CMP: 5/16/2024  › H/W/BMI: 6'1/170 lbs/ 22    Special comments/notes:    Telephone Colon Screening Questionnaire Yes No   Are you currently experiencing any new/abnormal GI symptoms? [x] []   If yes, explain:      Rectal bleeding? [] [x]   Black stool? [] [x]   Dysphagia or food \"feeling stuck\" when eating? [] [x]   Intractable vomiting? Continuous nausea with ocassional vomiting [x] []   Unexplained weight loss? 30 lbs within 6 months [x] []   Any issues with anesthesia? Delayed response [x] []   If yes, explain:      Any recent complaints of chest pain or shortness of breath?  [] [x]   Referred to a cardiologist?  [] []   If yes, explain:      Stroke, heart attack or stent placement in the last 12 months:  [] [x]   History of  respiratory issues/oxygen/PAYTON/COPD: [] [x]   If yes, CPAP/BiPAP:     History of devices (pacemaker/defibrillator) [] [x]   History of cardiac/CVA/(MI/stroke): [] [x]     Medications Yes  No   Anticoagulants (except Aspirin):  [] [x]   Diabetic Meds  PO: Hold day before and day of procedure  Insulin:  [] [x]   Weight loss meds (phentermine/vyvanse/saxsenda/etc): [] [x]   Iron/herbal/multivitamin  supplement: Vit D3, omega 3 [x] []   Usage of marijuana, CBD &/or vape products: marijuana 4-5 night a week to stimulate appetite [x] []

## 2024-10-01 NOTE — TELEPHONE ENCOUNTER
Patient is having continued nausea and occasional vomiting that worsens when eating. Patient asking if he can schedule scope. Please call at [phone],thanks.   *Patient will follow up with his urologist for his prostate

## 2024-10-01 NOTE — TELEPHONE ENCOUNTER
GI     Please call the patient to schedule for an upper endoscopy.    See MD orders below    Thanks

## 2024-10-01 NOTE — TELEPHONE ENCOUNTER
Agree with triage advice given.  May schedule an upper endoscopy for persistent nausea and vomiting with monitored anesthesia care.

## 2024-10-02 NOTE — TELEPHONE ENCOUNTER
Scheduled for:  EGD 63723  Provider Name:  Dr. Wilkins  Date:  1/2/2025  Location:  [facility]  Sedation:  MAC  Time:  2:20pm, pt is aware [facility] will call with arrival time  Prep:  NPO  Meds/Allergies Reconciled?:  Physician reviewed     Diagnosis with codes:  nausea and vomiting R11.2  Was patient informed to call insurance with codes (Y/N):  Yes     Referral sent?:  Referral was sent at the time of electronic surgical scheduling.   [facility] or [facility] notified?:  I sent an electronic request to Endo Scheduling and received a confirmation today.      Medication Orders:  n/a  Misc Orders:  n/a     Further instructions given by staff:   I discussed the prep instructions with the patient which he verbally understood and is aware that I will send the instructions today.

## 2024-10-04 NOTE — TELEPHONE ENCOUNTER
I s/w pt and determined that he is having urinary frequency as determined with AR in the past and behavioral methods were prescribed which he states he is using. He also reports that he is having GI issues which he is addressing with a GI specialist. I asked about UTI symptoms and he states he has burning at the tip of the penis, and cloudy foul smelling urine. I suggested that he submit a urine sample at the lab for UA and C&S and I will place the orders. I told pt that we will contact him with the results. Pt verbalized understanding and compliance.

## 2024-10-04 NOTE — TELEPHONE ENCOUNTER
LOV 9/3/24  PLAN:  Behavioral management give it 6-8 weeks to start working  Return to clinic as needed     Thank you for referring this very pleasant patient to my clinic. If you have any questions or concerns, please do not hesitate to contact me.     Sincerely,  Deanne Lorenzo MD

## 2024-10-20 DIAGNOSIS — E89.0 POSTOPERATIVE HYPOTHYROIDISM: ICD-10-CM

## 2024-10-20 RX ORDER — LEVOTHYROXINE SODIUM 150 UG/1
TABLET ORAL
Qty: 120 TABLET | Refills: 1 | Status: CANCELLED | OUTPATIENT
Start: 2024-10-20

## 2024-10-21 RX ORDER — ONDANSETRON 4 MG/1
4 TABLET, ORALLY DISINTEGRATING ORAL EVERY 8 HOURS PRN
Qty: 30 TABLET | Refills: 1 | Status: SHIPPED | OUTPATIENT
Start: 2024-10-21

## 2024-10-21 NOTE — TELEPHONE ENCOUNTER
Requested Prescriptions     Pending Prescriptions Disp Refills    ondansetron 4 MG Oral Tablet Dispersible 30 tablet 1     Sig: Take 1 tablet (4 mg total) by mouth every 8 (eight) hours as needed for Nausea.       LOV   6/19/2024      LR   7/18/2024      ME

## 2024-10-21 NOTE — TELEPHONE ENCOUNTER
Endocrine refill protocol for medications for hypothyroidism and hyperthyroidism    Protocol Criteria:  FAILED Reason: Abnormal labs    If all below requirements are met, send a 90-day supply with 1 refill per provider protocol.    Verify appointment with Endocrinology completed in the last 12 months or scheduled in the next 6 months.    Normal TSH result in the past 12 months   Review recent telephone encounters and mychart communications with patient to ensure a dose change has not occurred since last office visit that was not updated in the medication history list     Last completed office visit:6/13/2024 Eugenia Mancilla MD   Next scheduled Follow up:   Future Appointments   Date Time Provider Department Center   10/31/2024 10:40 AM Margy Florez MD [facility]   1/2/2025  2:20 PM STATAPOLINAR, PROCEDURE [id number] None      Last TSH result:   TSH   Date Value Ref Range Status   05/16/2024 0.029 (L) 0.550 - 4.780 mIU/mL Final     TSH (S)   Date Value Ref Range Status   06/04/2015 1.92 0.34 - 5.60 uIU/mL Final

## 2024-10-23 NOTE — TELEPHONE ENCOUNTER
Hi!    He is long overdue for blood tests. Please ask him to have these completed and then I can refill prescription. Thank you!

## 2024-10-29 ENCOUNTER — TELEPHONE (OUTPATIENT)
Dept: ENDOCRINOLOGY CLINIC | Facility: CLINIC | Age: 29
End: 2024-10-29

## 2024-10-29 ENCOUNTER — LAB ENCOUNTER (OUTPATIENT)
Dept: LAB | Age: 29
End: 2024-10-29
Attending: INTERNAL MEDICINE
Payer: COMMERCIAL

## 2024-10-29 DIAGNOSIS — R35.0 URINARY FREQUENCY: ICD-10-CM

## 2024-10-29 DIAGNOSIS — R53.83 FATIGUE, UNSPECIFIED TYPE: ICD-10-CM

## 2024-10-29 LAB
ALBUMIN SERPL-MCNC: 4.8 G/DL (ref 3.2–4.8)
ALBUMIN/GLOB SERPL: 1.8 {RATIO} (ref 1–2)
ALP LIVER SERPL-CCNC: 78 U/L
ALT SERPL-CCNC: 28 U/L
ANION GAP SERPL CALC-SCNC: 5 MMOL/L (ref 0–18)
AST SERPL-CCNC: 20 U/L (ref ?–34)
BASOPHILS # BLD AUTO: 0.05 X10(3) UL (ref 0–0.2)
BASOPHILS NFR BLD AUTO: 0.7 %
BILIRUB SERPL-MCNC: 0.7 MG/DL (ref 0.3–1.2)
BILIRUB UR QL: NEGATIVE
BUN BLD-MCNC: 6 MG/DL (ref 9–23)
BUN/CREAT SERPL: 7.4 (ref 10–20)
CALCIUM BLD-MCNC: 9.8 MG/DL (ref 8.7–10.4)
CHLORIDE SERPL-SCNC: 110 MMOL/L (ref 98–112)
CLARITY UR: CLEAR
CO2 SERPL-SCNC: 29 MMOL/L (ref 21–32)
COLOR UR: YELLOW
CORTIS SERPL-MCNC: 14.6 UG/DL
CREAT BLD-MCNC: 0.81 MG/DL
DEPRECATED RDW RBC AUTO: 43.9 FL (ref 35.1–46.3)
EGFRCR SERPLBLD CKD-EPI 2021: 122 ML/MIN/1.73M2 (ref 60–?)
EOSINOPHIL # BLD AUTO: 0.19 X10(3) UL (ref 0–0.7)
EOSINOPHIL NFR BLD AUTO: 2.6 %
ERYTHROCYTE [DISTWIDTH] IN BLOOD BY AUTOMATED COUNT: 13.3 % (ref 11–15)
FASTING STATUS PATIENT QL REPORTED: YES
FSH SERPL-ACNC: 2.6 MIU/ML
GLOBULIN PLAS-MCNC: 2.6 G/DL (ref 2–3.5)
GLUCOSE BLD-MCNC: 98 MG/DL (ref 70–99)
GLUCOSE UR-MCNC: NORMAL MG/DL
HCT VFR BLD AUTO: 44.7 %
HGB BLD-MCNC: 14.5 G/DL
HGB UR QL STRIP.AUTO: NEGATIVE
IMM GRANULOCYTES # BLD AUTO: 0.01 X10(3) UL (ref 0–1)
IMM GRANULOCYTES NFR BLD: 0.1 %
KETONES UR-MCNC: NEGATIVE MG/DL
LEUKOCYTE ESTERASE UR QL STRIP.AUTO: NEGATIVE
LH SERPL-ACNC: 4.3 MIU/ML
LYMPHOCYTES # BLD AUTO: 1.96 X10(3) UL (ref 1–4)
LYMPHOCYTES NFR BLD AUTO: 26.8 %
MCH RBC QN AUTO: 29.1 PG (ref 26–34)
MCHC RBC AUTO-ENTMCNC: 32.4 G/DL (ref 31–37)
MCV RBC AUTO: 89.6 FL
MONOCYTES # BLD AUTO: 0.45 X10(3) UL (ref 0.1–1)
MONOCYTES NFR BLD AUTO: 6.2 %
NEUTROPHILS # BLD AUTO: 4.64 X10 (3) UL (ref 1.5–7.7)
NEUTROPHILS # BLD AUTO: 4.64 X10(3) UL (ref 1.5–7.7)
NEUTROPHILS NFR BLD AUTO: 63.6 %
NITRITE UR QL STRIP.AUTO: NEGATIVE
OSMOLALITY SERPL CALC.SUM OF ELEC: 296 MOSM/KG (ref 275–295)
PH UR: 6.5 [PH] (ref 5–8)
PLATELET # BLD AUTO: 416 10(3)UL (ref 150–450)
POTASSIUM SERPL-SCNC: 4.2 MMOL/L (ref 3.5–5.1)
PROLACTIN SERPL-MCNC: 5.9 NG/ML
PROT SERPL-MCNC: 7.4 G/DL (ref 5.7–8.2)
PROT UR-MCNC: 20 MG/DL
RBC # BLD AUTO: 4.99 X10(6)UL
SODIUM SERPL-SCNC: 144 MMOL/L (ref 136–145)
SP GR UR STRIP: 1.02 (ref 1–1.03)
T4 FREE SERPL-MCNC: 1.8 NG/DL (ref 0.8–1.7)
TSI SER-ACNC: <0.008 MIU/ML (ref 0.55–4.78)
UROBILINOGEN UR STRIP-ACNC: NORMAL
VIT D+METAB SERPL-MCNC: 48.2 NG/ML (ref 30–100)
WBC # BLD AUTO: 7.3 X10(3) UL (ref 4–11)

## 2024-10-29 PROCEDURE — 87086 URINE CULTURE/COLONY COUNT: CPT

## 2024-10-29 PROCEDURE — 84443 ASSAY THYROID STIM HORMONE: CPT

## 2024-10-29 PROCEDURE — 83001 ASSAY OF GONADOTROPIN (FSH): CPT

## 2024-10-29 PROCEDURE — 84146 ASSAY OF PROLACTIN: CPT

## 2024-10-29 PROCEDURE — 82306 VITAMIN D 25 HYDROXY: CPT

## 2024-10-29 PROCEDURE — 80053 COMPREHEN METABOLIC PANEL: CPT

## 2024-10-29 PROCEDURE — 84439 ASSAY OF FREE THYROXINE: CPT

## 2024-10-29 PROCEDURE — 82533 TOTAL CORTISOL: CPT

## 2024-10-29 PROCEDURE — 84410 TESTOSTERONE BIOAVAILABLE: CPT

## 2024-10-29 PROCEDURE — 83002 ASSAY OF GONADOTROPIN (LH): CPT

## 2024-10-29 PROCEDURE — 85025 COMPLETE CBC W/AUTO DIFF WBC: CPT

## 2024-10-29 PROCEDURE — 81001 URINALYSIS AUTO W/SCOPE: CPT

## 2024-10-29 PROCEDURE — 36415 COLL VENOUS BLD VENIPUNCTURE: CPT

## 2024-10-29 PROCEDURE — 84305 ASSAY OF SOMATOMEDIN: CPT

## 2024-10-30 NOTE — TELEPHONE ENCOUNTER
Hi!    Please let patient know that I have reviewed some of the labs that have come back. I have a few comments to make:    1.) I very clearly told patient to have labs completed IN THE MORNING BEFORE 9AM. I will NOT be able to interpret the testosterone levels since they were drawn at the WRONG TIME.       2.) I would like to know what DOSE of levothyroxine he is on and how often he is taking the tablets.     THANK YOU.    Please also inform patient that he will have to repeat his bloodwork. Thank you

## 2024-10-31 ENCOUNTER — OFFICE VISIT (OUTPATIENT)
Dept: OTOLARYNGOLOGY | Facility: CLINIC | Age: 29
End: 2024-10-31
Payer: COMMERCIAL

## 2024-10-31 DIAGNOSIS — J01.30 SUBACUTE SPHENOIDAL SINUSITIS: Primary | ICD-10-CM

## 2024-10-31 DIAGNOSIS — C73 FOLLICULAR THYROID CANCER (HCC): ICD-10-CM

## 2024-10-31 DIAGNOSIS — R09.82 ALLERGIC RHINITIS WITH POSTNASAL DRIP: ICD-10-CM

## 2024-10-31 DIAGNOSIS — J30.9 ALLERGIC RHINITIS WITH POSTNASAL DRIP: ICD-10-CM

## 2024-10-31 PROCEDURE — 99203 OFFICE O/P NEW LOW 30 MIN: CPT | Performed by: SPECIALIST

## 2024-10-31 RX ORDER — LORAZEPAM 1 MG/1
1 TABLET ORAL 2 TIMES DAILY PRN
COMMUNITY
Start: 2024-10-28

## 2024-10-31 RX ORDER — PREDNISONE 20 MG/1
20 TABLET ORAL DAILY
Qty: 3 TABLET | Refills: 0 | Status: SHIPPED | OUTPATIENT
Start: 2024-10-31

## 2024-10-31 RX ORDER — LISDEXAMFETAMINE DIMESYLATE 50 MG
50 CAPSULE ORAL DAILY
COMMUNITY
Start: 2024-10-30

## 2024-10-31 RX ORDER — DOXYCYCLINE 100 MG/1
100 CAPSULE ORAL 2 TIMES DAILY
Qty: 28 CAPSULE | Refills: 0 | Status: SHIPPED | OUTPATIENT
Start: 2024-10-31

## 2024-10-31 NOTE — TELEPHONE ENCOUNTER
Pt completed labs.     Component      Latest Ref Rng 10/29/2024   WBC      4.0 - 11.0 x10(3) uL 7.3    RBC      4.30 - 5.70 x10(6)uL 4.99    Hemoglobin      13.0 - 17.5 g/dL 14.5    Hematocrit      39.0 - 53.0 % 44.7    MCV      80.0 - 100.0 fL 89.6    MCH      26.0 - 34.0 pg 29.1    MCHC      31.0 - 37.0 g/dL 32.4    RDW-SD      35.1 - 46.3 fL 43.9    RDW      11.0 - 15.0 % 13.3    Platelet Count      150.0 - 450.0 10(3)uL 416.0    Prelim Neutrophil Abs      1.50 - 7.70 x10 (3) uL 4.64    Neutrophils Absolute      1.50 - 7.70 x10(3) uL 4.64    Lymphocytes Absolute      1.00 - 4.00 x10(3) uL 1.96    Monocytes Absolute      0.10 - 1.00 x10(3) uL 0.45    Eosinophils Absolute      0.00 - 0.70 x10(3) uL 0.19    Basophils Absolute      0.00 - 0.20 x10(3) uL 0.05    Immature Granulocyte Absolute      0.00 - 1.00 x10(3) uL 0.01    Neutrophils %      % 63.6    Lymphocytes %      % 26.8    Monocytes %      % 6.2    Eosinophils %      % 2.6    Basophils %      % 0.7    Immature Granulocyte %      % 0.1    Glucose      70 - 99 mg/dL 98    Sodium      136 - 145 mmol/L 144    Potassium      3.5 - 5.1 mmol/L 4.2    Chloride      98 - 112 mmol/L 110    Carbon Dioxide, Total      21.0 - 32.0 mmol/L 29.0    ANION GAP      0 - 18 mmol/L 5    BUN      9 - 23 mg/dL 6 (L)    CREATININE      0.70 - 1.30 mg/dL 0.81    BUN/CREATININE RATIO      10.0 - 20.0  7.4 (L)    CALCIUM      8.7 - 10.4 mg/dL 9.8    CALCULATED OSMOLALITY      275 - 295 mOsm/kg 296 (H)    EGFR      >=60 mL/min/1.73m2 122    ALT (SGPT)      10 - 49 U/L 28    AST (SGOT)      <34 U/L 20    ALKALINE PHOSPHATASE      45 - 117 U/L 78    Total Bilirubin      0.3 - 1.2 mg/dL 0.7    PROTEIN, TOTAL      5.7 - 8.2 g/dL 7.4    Albumin      3.2 - 4.8 g/dL 4.8    Globulin      2.0 - 3.5 g/dL 2.6    A/G Ratio      1.0 - 2.0  1.8    Patient Fasting for CMP? Yes    T4,Free (Direct)      0.8 - 1.7 ng/dL 1.8 (H)    TSH      0.550 - 4.780 mIU/mL <0.008 (L)    Cortisol      ug/dL 14.6     LH      1.5-9.3 mIU/mL mIU/mL 4.3    FSH      1.4-18.1 mIU/mL mIU/mL 2.6    Prolactin      2.1-17.7 ng/mL ng/mL 5.9    VITAMIN D, 25-OH, TOTAL      30.0 - 100.0 ng/mL 48.2       Legend:  (L) Low  (H) High

## 2024-10-31 NOTE — PROGRESS NOTES
Bakari Avina is a 29 year old male.   Chief Complaint   Patient presents with    Sinus Problem     Sinus pressure and headaches  Pt reports difficulty breathing through nose    Ear Problem     Pt reports feeling liquid inside ears     HPI:   Patient with left sphenoid sinusitis seen on November 16, 2023.  He reports sinus pressure and headaches.  He also was tested and he has known allergic rhinitis to both pets and pollen.    Current Outpatient Medications   Medication Sig Dispense Refill    VYVANSE 50 MG Oral Cap Take 1 capsule (50 mg total) by mouth daily.      LORazepam 1 MG Oral Tab Take 1 tablet (1 mg total) by mouth 2 (two) times daily as needed.      predniSONE 20 MG Oral Tab Take 1 tablet (20 mg total) by mouth daily. 3 tablet 0    doxycycline 100 MG Oral Cap Take 1 capsule (100 mg total) by mouth 2 (two) times daily. 28 capsule 0    ondansetron 4 MG Oral Tablet Dispersible Take 1 tablet (4 mg total) by mouth every 8 (eight) hours as needed for Nausea. 30 tablet 1    Omega 3 1000 MG Oral Cap Take 3 capsules by mouth at bedtime.      levETIRAcetam 500 MG Oral Tab TAKE 1 TABLET BY MOUTH IN THE MORNING AND 2 TABLETS BY MOUTH IN THE EVENING (Patient taking differently: Take 1 tablet (500 mg total) by mouth 2 (two) times daily. TAKE 1 TABLET BY MOUTH IN THE MORNING AND 2 TABLETS BY MOUTH IN THE EVENING) 270 tablet 3    levothyroxine 150 MCG Oral Tab Take one tablet everyday 120 tablet 1    gabapentin 300 MG Oral Cap Take 1 capsule (300 mg total) by mouth in the morning, at noon, and at bedtime. 90 capsule 1    ergocalciferol 1.25 MG (42068 UT) Oral Cap Take 1 capsule (50,000 Units total) by mouth once a week.      hydrOXYzine Pamoate 25 MG Oral Cap TAKE 1 TO 2 CAPSULES BY MOUTH FOUR TIMES DAILY UNTIL FUTHER NOTICE AS NEEDED (Patient not taking: Reported on 10/1/2024)      Cholecalciferol (VITAMIN D3) 1.25 MG (51546 UT) Oral Cap Take 50,000 Int'l Units/day by mouth once a week.      thiamine 100 MG Oral Tab  Take 1 tablet (100 mg total) by mouth daily. (Patient not taking: Reported on 8/8/2023)      rosuvastatin 20 MG Oral Tab Take 1 tablet (20 mg total) by mouth nightly.      Levothyroxine Sodium 112 MCG Oral Tab Take 1 tablet (112 mcg total) by mouth daily. Bottle states: \"take 1.5 tabs daily\"      Levocetirizine Dihydrochloride (XYZAL) 5 MG Oral Tab Take 1 tablet (5 mg total) by mouth nightly. 30 tablet 2      Past Medical History:    Allergic rhinitis    Anxiety    Blurry vision    Congenital scoliosis    Depression    Esophageal reflux    Fracture, finger    fx left 4th finger - surgical repair    Hyperlipidemia    Hypothyroidism    Thyroid removed d/t CA 2011    Myopia with astigmatism    OU    Seizure disorder (HCC)    throid cancer age 16    Thyroid carcinoma (HCC)    thyroidectomy    Thyroid carcinoma (HCC)      Social History:  Social History     Socioeconomic History    Marital status: Single   Tobacco Use    Smoking status: Never    Smokeless tobacco: Current    Tobacco comments:     8-10 pouches per day   Vaping Use    Vaping status: Every Day    Substances: THC    Devices: Pre-filled or refillable cartridge   Substance and Sexual Activity    Alcohol use: Not Currently     Comment: Last drink 8/01    Drug use: Yes     Frequency: 7.0 times per week     Types: Cannabis, Amphetamines     Comment: Vape about 5 times per week (\"4 puffs per night\"). History of Adderall use.   Other Topics Concern    Pt has a pacemaker No    Pt has a defibrillator No    Reaction to local anesthetic No    Caffeine Concern Yes        REVIEW OF SYSTEMS:   GENERAL HEALTH: feels well otherwise  GENERAL : denies fever, chills, sweats, weight loss, weight gain  SKIN: denies any unusual skin lesions or rashes  RESPIRATORY: denies shortness of breath with exertion  NEURO: denies headaches    EXAM:   There were no vitals taken for this visit.  System Details   Skin Inspection - Normal.   Constitutional Overall appearance - Normal.    Head/Face Facial features - Normal. Eyebrows - Normal. Skull - Normal.   Eyes Conjunctiva - Right: Normal, Left: Normal. Pupil - Right: Normal, Left: Normal.    Ears Inspection - Right: Normal, Left: Normal.   Canal - Right: Normal, Left: Normal.   TM - Right: Normal, Left: Normal.   Nasal External nose - Normal.   Nasal septum - Normal.  Turbinates -congestion.  No purulence seen by fiberoptic examination.   Oral/Oropharynx Lips - Normal, Tonsils - Normal, Tongue - Normal    Neck Exam Inspection - Normal. Palpation - Normal. Parotid gland - Normal. Thyroid gland - Normal.   Lymph Detail Submental. Submandibular. Anterior cervical. Posterior cervical. Supraclavicular all without enlargement   Psychiatric Orientation - Oriented to time, place, person & situation. Appropriate mood and affect.   Neurological Memory - Normal. Cranial nerves - Cranial nerves II through XII grossly intact.     ASSESSMENT AND PLAN:   1. Subacute sphenoidal sinusitis  As seen on CT scan in November 2023.  Patient placed on prednisone, and doxycycline.  Follow-up in 3 weeks time, sooner if problems.  If symptoms are persistent, a fiberoptic scope will be recommended.  A culture will be taken if any purulence can be found.    2. Follicular thyroid cancer (HCC)  No evidence of recurrent or metastatic cancer.    3. Allergic rhinitis with postnasal drip  Patient with known allergies to pets and pollen.      The patient indicates understanding of these issues and agrees to the plan.      Margy Florez MD  10/31/2024  3:57 PM

## 2024-10-31 NOTE — PATIENT INSTRUCTIONS
You were placed on prednisone and doxycycline for your left sphenoid sinusitis as seen on your CT scan done on #16 2023.  Follow-up in 3 weeks time, sooner if problems.  If symptoms persist a fiberoptic scope will be recommended.

## 2024-11-02 LAB
IGF I: 134 NG/ML
IGF-1, Z SCORE: -1.1 S.D.

## 2024-11-04 NOTE — TELEPHONE ENCOUNTER
LVM for patient to call back or check [employer] messages.     Per LOV start taking LT4 150mcg PO qday. Will confirm dosing and schedule when patient calls back.

## 2024-11-06 LAB
SEX HORM BIND GLOB: 32.7 NMOL/L
TESTOST % FREE+WEAK BND: 30.8 %
TESTOST FREE+WEAK BND: 163.4 NG/DL
TESTOSTERONE TOT /MS: 530.4 NG/DL

## 2024-11-10 ENCOUNTER — APPOINTMENT (OUTPATIENT)
Dept: GENERAL RADIOLOGY | Age: 29
End: 2024-11-10
Attending: PHYSICIAN ASSISTANT
Payer: COMMERCIAL

## 2024-11-10 ENCOUNTER — HOSPITAL ENCOUNTER (OUTPATIENT)
Age: 29
Discharge: HOME | End: 2024-11-10
Payer: COMMERCIAL

## 2024-11-10 VITALS
SYSTOLIC BLOOD PRESSURE: 135 MMHG | TEMPERATURE: 98 F | HEART RATE: 112 BPM | OXYGEN SATURATION: 98 % | DIASTOLIC BLOOD PRESSURE: 76 MMHG | RESPIRATION RATE: 20 BRPM

## 2024-11-10 DIAGNOSIS — M25.551 RIGHT HIP PAIN: Primary | ICD-10-CM

## 2024-11-10 PROCEDURE — 99213 OFFICE O/P EST LOW 20 MIN: CPT

## 2024-11-10 PROCEDURE — 73502 X-RAY EXAM HIP UNI 2-3 VIEWS: CPT | Performed by: PHYSICIAN ASSISTANT

## 2024-11-10 PROCEDURE — 99214 OFFICE O/P EST MOD 30 MIN: CPT

## 2024-11-10 RX ORDER — IBUPROFEN 600 MG/1
TABLET, FILM COATED ORAL
Qty: 20 TABLET | Refills: 0 | Status: SHIPPED | OUTPATIENT
Start: 2024-11-10

## 2024-11-10 NOTE — ED INITIAL ASSESSMENT (HPI)
Patient arrives ambulatory with c/o right sided pelvic an hip pain after hearing a pop yesterday. Reports he feels like his leg is turned inward.

## 2024-11-10 NOTE — ED PROVIDER NOTES
Patient Seen in: [facility]    History     Chief Complaint   Patient presents with    Leg Pain     Stated Complaint: hip and pelvis pain    HPI    HPI: Bakari Avina is a 29 year old male who presents with chief complaint of right hip pain.  Onset 1 week ago.  Patient states pain began when bending forward to  his cat.  Patient denies other injury, head/neck injury or pain, open wound, decreased range of motion, swelling, ecchymosis, erythema, weakness, paraesthesias.      Past Medical History:    Allergic rhinitis    Anxiety    Blurry vision    Congenital scoliosis    Depression    Esophageal reflux    Fracture, finger    fx left 4th finger - surgical repair    Hyperlipidemia    Hypothyroidism    Thyroid removed d/t CA 2011    Myopia with astigmatism    OU    Seizure disorder (HCC)    throid cancer age 16    Thyroid carcinoma (HCC)    thyroidectomy    Thyroid carcinoma (HCC)       Past Surgical History:   Procedure Laterality Date    Hand/finger surgery unlisted      Thyroidectomy  2011            Family History   Problem Relation Age of Onset    Glaucoma Father     Lipids Father         hyperlipidemia    Melanoma Father     Other (hyperlipidemia) Father     Hypertension Mother     Ovarian Cancer Maternal Grandmother     Cancer Maternal Grandmother     Other (alzheimer's disease) Paternal Grandmother     Other (Myocardial infarction) Paternal Grandfather     Heart Disorder Paternal Grandfather         Resulted in death    Depression Sister     Lung Disorder Sister         exercise induced bronchitis    Depression Brother     Anxiety Brother     Cancer Maternal Grandfather        Social History     Socioeconomic History    Marital status: Single   Tobacco Use    Smoking status: Never    Smokeless tobacco: Current    Tobacco comments:     8-10 pouches per day   Vaping Use    Vaping status: Every Day    Substances: THC    Devices: Pre-filled or refillable cartridge   Substance and Sexual  Activity    Alcohol use: Not Currently     Comment: Last drink 8/01    Drug use: Yes     Frequency: 7.0 times per week     Types: Cannabis, Amphetamines     Comment: Vape about 5 times per week (\"4 puffs per night\"). History of Adderall use.   Other Topics Concern    Pt has a pacemaker No    Pt has a defibrillator No    Reaction to local anesthetic No    Caffeine Concern Yes       Review of Systems    Positive for stated complaint: hip and pelvis pain  Other systems are as noted in HPI.  Constitutional and vital signs reviewed.      All other systems reviewed and negative except as noted above.    PSFH elements reviewed from today and agreed except as otherwise stated in HPI.    Physical Exam     ED Triage Vitals [11/10/24 1532]   /76   Pulse (!) 124   Resp 20   Temp 97.8 °F (36.6 °C)   Temp src Temporal   SpO2 98 %   O2 Device None (Room air)       Current:/76   Pulse 112   Temp 97.8 °F (36.6 °C) (Temporal)   Resp 20   SpO2 98%     PULSE OX within normal limits on room air as interpreted by this provider.    Physical Exam      Constitutional: The patient is cooperative. Appears well-developed and well-nourished.  Mild discomfort.  Psychological: Alert, No abnormalities of mood, affect.  Head: Normocephalic/atraumatic.  Eyes: Pupils are equal round reactive to light.  Conjunctiva are within normal limits.  ENT: Oropharynx is clear.  Neck: The neck is supple.  No meningeal signs.  Respiratory: Respiratory effort was normal.  There is no stridor.  Air entry is equal.  Cardiovascular: Tachycardic, regular rhythm.  Capillary refill is brisk.   Genitourinary: Not examined.  Lymphatic: No gross lymphadenopathy noted.  Musculoskeletal: Right lower extremity-Right lower extremity normal to inspection without acute bony deformity.  Positive tenderness to palpation present at right lateral hip.  Remainder of right lower extremity nontender to patient.  Full range of motion of right hip.  Distal pulses intact.   Capillary refill normal.  Motor intact distally.  Sensory intact distally.  No ecchymosis.  No erythema.  No open wounds.  No compartment syndrome.  No edema.  Thoracic and lumbar spine nontender to palpation.  Remainder of musculoskeletal system is grossly intact.  There is no obvious deformity.  Neurological: Gross motor movement is intact in all 4 extremities.  Patient exhibits normal speech.  Skin: Skin is normal to inspection, except as documented.  Warm and dry.  No obvious rash.        ED Course   Labs Reviewed - No data to display    MDM     Differential diagnosis prior to work-up including but not limited to fracture, strain/sprain, contusion    Radiology findings: XR HIP W OR WO PELVIS 2 OR 3 VIEWS, RIGHT (CPT=73502)    Result Date: 11/10/2024  CONCLUSION: No acute fracture or dislocation.  No significant arthropathy.  Soft tissues and bowel gas pattern are unremarkable.      Dictated by (CST): Kristofer Francisco MD on 11/10/2024 at 4:19 PM     Finalized by (CST): Kristofer Francisco MD on 11/10/2024 at 4:19 PM           X-ray images of right hip and pelvis independently viewed by this provider-no acute fracture.     Physical exam remained stable as previously documented.  Available results reviewed with patient.    I have given the patient instructions regarding their diagnoses, expectations, follow up, and ER precautions. I explained to the patient that emergent conditions may arise and to go to the ER for new, worsening or any persistent conditions. I've explained the importance of following up with their doctor as instructed. The patient verbalized understanding of the discharge instructions and plan.    Disposition and Plan     Clinical Impression:  1. Right hip pain        Disposition:  Discharge    Follow-up:  River Larsen MD  [address]  [address]  [facility] [id number] [phone]    Call in 1 day  For follow-up    Pawel Underwood MD  [address]  [address]  [place]  [address]  [phone]    Call in 1 day  For follow-up      Medications Prescribed:  Current Discharge Medication List        START taking these medications    Details   ibuprofen 600 MG Oral Tab Take 1 tablet (600 mg total) by mouth every 6 hours with food  Qty: 20 tablet, Refills: 0

## 2024-12-26 ENCOUNTER — TELEPHONE (OUTPATIENT)
Facility: CLINIC | Age: 29
End: 2024-12-26

## 2024-12-31 RX ORDER — LEVOTHYROXINE SODIUM 150 UG/1
150 TABLET ORAL
COMMUNITY

## 2025-01-02 ENCOUNTER — ANESTHESIA (OUTPATIENT)
Dept: ENDOSCOPY | Facility: HOSPITAL | Age: 30
End: 2025-01-02
Payer: COMMERCIAL

## 2025-01-02 ENCOUNTER — ANESTHESIA EVENT (OUTPATIENT)
Dept: ENDOSCOPY | Facility: HOSPITAL | Age: 30
End: 2025-01-02
Payer: COMMERCIAL

## 2025-01-02 ENCOUNTER — HOSPITAL ENCOUNTER (OUTPATIENT)
Facility: HOSPITAL | Age: 30
Setting detail: HOSPITAL OUTPATIENT SURGERY
Discharge: HOME | End: 2025-01-02
Attending: INTERNAL MEDICINE | Admitting: INTERNAL MEDICINE
Payer: COMMERCIAL

## 2025-01-02 VITALS
SYSTOLIC BLOOD PRESSURE: 105 MMHG | HEIGHT: 73.5 IN | HEART RATE: 74 BPM | DIASTOLIC BLOOD PRESSURE: 69 MMHG | BODY MASS INDEX: 20.75 KG/M2 | RESPIRATION RATE: 11 BRPM | TEMPERATURE: 99 F | OXYGEN SATURATION: 95 % | WEIGHT: 160 LBS

## 2025-01-02 DIAGNOSIS — R11.2 NAUSEA AND VOMITING, UNSPECIFIED VOMITING TYPE: ICD-10-CM

## 2025-01-02 LAB — SARS-COV-2 RNA RESP QL NAA+PROBE: NOT DETECTED

## 2025-01-02 PROCEDURE — 0DB78ZX EXCISION OF STOMACH, PYLORUS, VIA NATURAL OR ARTIFICIAL OPENING ENDOSCOPIC, DIAGNOSTIC: ICD-10-PCS | Performed by: INTERNAL MEDICINE

## 2025-01-02 PROCEDURE — 43239 EGD BIOPSY SINGLE/MULTIPLE: CPT | Performed by: INTERNAL MEDICINE

## 2025-01-02 PROCEDURE — 0DB98ZX EXCISION OF DUODENUM, VIA NATURAL OR ARTIFICIAL OPENING ENDOSCOPIC, DIAGNOSTIC: ICD-10-PCS | Performed by: INTERNAL MEDICINE

## 2025-01-02 PROCEDURE — 0DB38ZX EXCISION OF LOWER ESOPHAGUS, VIA NATURAL OR ARTIFICIAL OPENING ENDOSCOPIC, DIAGNOSTIC: ICD-10-PCS | Performed by: INTERNAL MEDICINE

## 2025-01-02 RX ORDER — MIDAZOLAM HYDROCHLORIDE 1 MG/ML
INJECTION INTRAMUSCULAR; INTRAVENOUS AS NEEDED
Status: DISCONTINUED | OUTPATIENT
Start: 2025-01-02 | End: 2025-01-02 | Stop reason: SURG

## 2025-01-02 RX ORDER — GLYCOPYRROLATE 0.2 MG/ML
INJECTION, SOLUTION INTRAMUSCULAR; INTRAVENOUS AS NEEDED
Status: DISCONTINUED | OUTPATIENT
Start: 2025-01-02 | End: 2025-01-02 | Stop reason: SURG

## 2025-01-02 RX ORDER — SODIUM CHLORIDE, SODIUM LACTATE, POTASSIUM CHLORIDE, CALCIUM CHLORIDE 600; 310; 30; 20 MG/100ML; MG/100ML; MG/100ML; MG/100ML
INJECTION, SOLUTION INTRAVENOUS CONTINUOUS
Status: DISCONTINUED | OUTPATIENT
Start: 2025-01-02 | End: 2025-01-02

## 2025-01-02 RX ORDER — ONDANSETRON 2 MG/ML
INJECTION INTRAMUSCULAR; INTRAVENOUS AS NEEDED
Status: DISCONTINUED | OUTPATIENT
Start: 2025-01-02 | End: 2025-01-02 | Stop reason: SURG

## 2025-01-02 RX ORDER — NALOXONE HYDROCHLORIDE 0.4 MG/ML
0.08 INJECTION, SOLUTION INTRAMUSCULAR; INTRAVENOUS; SUBCUTANEOUS ONCE AS NEEDED
Status: DISCONTINUED | OUTPATIENT
Start: 2025-01-02 | End: 2025-01-02

## 2025-01-02 RX ORDER — ONDANSETRON 2 MG/ML
4 INJECTION INTRAMUSCULAR; INTRAVENOUS ONCE AS NEEDED
Status: DISCONTINUED | OUTPATIENT
Start: 2025-01-02 | End: 2025-01-02

## 2025-01-02 RX ORDER — LIDOCAINE HYDROCHLORIDE 10 MG/ML
INJECTION, SOLUTION EPIDURAL; INFILTRATION; INTRACAUDAL; PERINEURAL AS NEEDED
Status: DISCONTINUED | OUTPATIENT
Start: 2025-01-02 | End: 2025-01-02 | Stop reason: SURG

## 2025-01-02 RX ADMIN — GLYCOPYRROLATE 0.2 MG: 0.2 INJECTION, SOLUTION INTRAMUSCULAR; INTRAVENOUS at 10:57:00

## 2025-01-02 RX ADMIN — LIDOCAINE HYDROCHLORIDE 70 MG: 10 INJECTION, SOLUTION EPIDURAL; INFILTRATION; INTRACAUDAL; PERINEURAL at 10:59:00

## 2025-01-02 RX ADMIN — SODIUM CHLORIDE, SODIUM LACTATE, POTASSIUM CHLORIDE, CALCIUM CHLORIDE: 600; 310; 30; 20 INJECTION, SOLUTION INTRAVENOUS at 10:56:00

## 2025-01-02 RX ADMIN — MIDAZOLAM HYDROCHLORIDE 2 MG: 1 INJECTION INTRAMUSCULAR; INTRAVENOUS at 10:59:00

## 2025-01-02 RX ADMIN — ONDANSETRON 4 MG: 2 INJECTION INTRAMUSCULAR; INTRAVENOUS at 10:58:00

## 2025-01-02 RX ADMIN — MIDAZOLAM HYDROCHLORIDE 2 MG: 1 INJECTION INTRAMUSCULAR; INTRAVENOUS at 10:57:00

## 2025-01-02 RX ADMIN — SODIUM CHLORIDE, SODIUM LACTATE, POTASSIUM CHLORIDE, CALCIUM CHLORIDE: 600; 310; 30; 20 INJECTION, SOLUTION INTRAVENOUS at 11:16:00

## 2025-01-02 NOTE — OPERATIVE REPORT
[facility] Endoscopy Report      Date of Procedure:  01/02/25        Preoperative Diagnosis:  Anorexia, nausea, vomiting, weight loss      Postoperative Diagnosis:  Tiny hiatal hernia      Procedure:    Esophagogastroduodenoscopy with biopsy      Surgeon:  Ryan Wilkins M.D.      Anesthesia:  Monitored anesthesia care  EBL:  Insignificant      Brief History:  This is a 29 year old male who presents with anorexia, nausea, scant vomiting and weight loss.  Laboratory testing including inflammatory markers and sprue serology along with a CT scan have been negative.  Symptoms are felt to be functional, however, in light of the ongoing symptoms/signs upper endoscopy is being performed to exclude other etiologies.      Technique:  After informed consent, the patient was placed in the left lateral recumbent position.  An Olympus adult HD gastroscope was inserted into the hypopharynx and advanced under direct vision into the esophagus, stomach and duodenum.  The endoscope was withdrawn and a retroflexed view of the gastric angulus, body, cardia and fundus was performed.  The instrument was straightened insufflated air and fluid were suctioned and the endoscope was withdrawn.  The procedure was well tolerated without immediate complication.    Findings:  The esophagus was normal without evidence of ulceration, erosion, stricture, ring or Gama's esophagus.  A few biopsies were taken from the distal esophagus.  The GE junction and diaphragmatic impression were at 40/41 cm.  The stomach distended appropriately with insufflated air.  There was no retained food material within the stomach.  The mucosa of the stomach including cardia, fundus, gastric body and antrum was normal with the exception of a slightly patulous cardia seen in the retroflexed position.  Biopsies were obtained from the antrum.  The duodenal bulb and post bulbar region were normal with a normal villous pattern.  Biopsies were  obtained from the duodenum.      Impression:  1.  Tiny hiatal hernia  2.  Otherwise normal examination of the upper digestive tract.    Recommendations:  1.  Follow-up biopsy results.  2.  Continue symptomatic treatment.      Ryan Wilkins MD  1/2/2025  11:16 AM

## 2025-01-02 NOTE — ANESTHESIA PREPROCEDURE EVALUATION
Anesthesia PreOp Note    HPI:     Bakari Avina is a 29 year old male who presents for preoperative consultation requested by: Ryan Wilkins MD    Date of Surgery: 1/2/2025    Procedure(s):  ESOPHAGOGASTRODUODENOSCOPY  Indication: Nausea and vomiting, unspecified vomiting type    Relevant Problems   No relevant active problems       NPO:  Last Liquid Consumption Date: 01/02/25  Last Liquid Consumption Time: 0845 (1/4 water bottle drunk)  Last Solid Consumption Date: 01/01/25  Last Solid Consumption Time: 1830  Last Liquid Consumption Date: 01/02/25          History Review:  Patient Active Problem List    Diagnosis Date Noted    Fatigue 06/22/2024    Follicular thyroid cancer (HCC) 06/13/2023    Anxiety disorder, unspecified 04/27/2022    throid cancer age 16     Chronic nausea     Seizure (HCC) 06/29/2021    TIM (acute kidney injury) (HCC) 06/29/2021    Nausea and vomiting     Gastroesophageal reflux disease     Acne vulgaris 05/16/2016    Scar condition and fibrosis of skin 05/16/2016    Myopia of both eyes with astigmatism 12/18/2015    Exophoria 12/18/2015    Other acne 08/19/2014    Hx of thyroid cancer 08/09/2014    Hypothyroidism 06/13/2013       Past Medical History:    Allergic rhinitis    Anxiety    Attention deficit hyperactivity disorder (ADHD)    Blurry vision    Congenital scoliosis    Depression    Disorder of thyroid    Esophageal reflux    Fracture, finger    fx left 4th finger - surgical repair    Hyperlipidemia    Hypothyroidism    Thyroid removed d/t CA 2011    Myopia with astigmatism    OU    PONV (postoperative nausea and vomiting)    nausea    Seizure disorder (HCC)    last seizure March 2023    throid cancer age 16    Thyroid carcinoma (HCC)    thyroidectomy    Thyroid carcinoma (HCC)       Past Surgical History:   Procedure Laterality Date    Hand/finger surgery unlisted      Thyroidectomy  2011       Prescriptions Prior to Admission[1]  Current Medications and Prescriptions  Ordered in Epic[2]    Allergies[3]    Family History   Problem Relation Age of Onset    Glaucoma Father     Lipids Father         hyperlipidemia    Melanoma Father     Other (hyperlipidemia) Father     Hypertension Mother     Ovarian Cancer Maternal Grandmother     Cancer Maternal Grandmother     Other (alzheimer's disease) Paternal Grandmother     Other (Myocardial infarction) Paternal Grandfather     Heart Disorder Paternal Grandfather         Resulted in death    Depression Sister     Lung Disorder Sister         exercise induced bronchitis    Depression Brother     Anxiety Brother     Cancer Maternal Grandfather      Social History     Socioeconomic History    Marital status: Single   Tobacco Use    Smoking status: Never    Smokeless tobacco: Current    Tobacco comments:     8-10 pouches per day   Vaping Use    Vaping status: Every Day    Substances: THC    Devices: Pre-filled or refillable cartridge   Substance and Sexual Activity    Alcohol use: Not Currently     Comment: Last drink 8/01    Drug use: Yes     Frequency: 7.0 times per week     Types: Cannabis, Amphetamines     Comment: Vape about 5 times per week (\"4 puffs per night\"). History of Adderall use.   Other Topics Concern    Pt has a pacemaker No    Pt has a defibrillator No    Reaction to local anesthetic No    Caffeine Concern Yes       Available pre-op labs reviewed.  Lab Results   Component Value Date    WBC 7.3 10/29/2024    RBC 4.99 10/29/2024    HGB 14.5 10/29/2024    HCT 44.7 10/29/2024    MCV 89.6 10/29/2024    MCH 29.1 10/29/2024    MCHC 32.4 10/29/2024    RDW 13.3 10/29/2024    .0 10/29/2024     Lab Results   Component Value Date     10/29/2024    K 4.2 10/29/2024     10/29/2024    CO2 29.0 10/29/2024    BUN 6 (L) 10/29/2024    CREATSERUM 0.81 10/29/2024    GLU 98 10/29/2024    CA 9.8 10/29/2024          Vital Signs:  Body mass index is 20.82 kg/m².   height is 1.867 m (6' 1.5\") and weight is 72.6 kg (160 lb). His oral  temperature is 98.7 °F (37.1 °C). His blood pressure is 118/83 and his pulse is 98. His respiration is 13 and oxygen saturation is 97%.   Vitals:    12/31/24 1047 01/02/25 1018 01/02/25 1030   BP:   118/83   Pulse:   98   Resp:   13   Temp:   98.7 °F (37.1 °C)   TempSrc:   Oral   SpO2:   97%   Weight: 74.8 kg (165 lb) 72.6 kg (160 lb)    Height: 1.867 m (6' 1.5\") 1.867 m (6' 1.5\")         Anesthesia Evaluation     Patient summary reviewed and Nursing notes reviewed    History of anesthetic complications (PONV)   Airway   Mallampati: II  TM distance: >3 FB  Neck ROM: full  Dental - Dentition appears grossly intact     Comment: Pt denies any loose or missing teeth.     Pulmonary - normal exam    breath sounds clear to auscultation    ROS comment: Vapes THC daily  Cardiovascular - negative ROS and normal exam  Exercise tolerance: good    Rhythm: regular  Rate: normal    Neuro/Psych    (+)  anxiety/panic attacks,  attention deficit hyperactivity disorder, depression      GI/Hepatic/Renal    (+) GERD    Endo/Other    (+) hypothyroidism    Comments: Thyroid Ca (15 y/o) s/p thyroidectomy 2011, hypothyroid followed by endocrinology,   Abdominal                  Anesthesia Plan:   ASA:  2  Plan:   MAC  Plan Comments: Discussed the plan for monitored anesthesia care.  Risks including but not limited to aspiration, awareness, and assistance with breathing including but not limited intubation and conversion to a general anesthesia should depth of sedation or pain control is not satisfactory.  Pt expressed a thorough understanding and wishes to proceed.   Informed Consent Plan and Risks Discussed With:  Patient  Discussed plan with:  Attending      I have informed Bakari Avina and/or legal guardian or family member of the nature of the anesthetic plan, benefits, risks including possible dental damage if relevant, major complications, and any alternative forms of anesthetic management.   All of the patient's questions  were answered to the best of my ability. The patient desires the anesthetic management as planned.  Rabia Ramirez MD  1/2/2025 10:43 AM  Present on Admission:  **None**           [1]   Medications Prior to Admission   Medication Sig Dispense Refill Last Dose/Taking    levothyroxine 150 MCG Oral Tab Take 1 tablet (150 mcg total) by mouth before breakfast.   1/1/2025    VYVANSE 50 MG Oral Cap Take 1 capsule (50 mg total) by mouth daily.   1/1/2025    Omega 3 1000 MG Oral Cap Take 3 capsules by mouth at bedtime.   12/27/2024    levETIRAcetam 500 MG Oral Tab TAKE 1 TABLET BY MOUTH IN THE MORNING AND 2 TABLETS BY MOUTH IN THE EVENING (Patient taking differently: Take 1 tablet (500 mg total) by mouth 2 (two) times daily. TAKE 1 TABLET BY MOUTH IN THE MORNING AND 2 TABLETS BY MOUTH IN THE EVENING) 270 tablet 3 1/1/2025    gabapentin 300 MG Oral Cap Take 1 capsule (300 mg total) by mouth in the morning, at noon, and at bedtime. (Patient taking differently: Take 2 capsules (600 mg total) by mouth in the morning and 2 capsules (600 mg total) before bedtime.) 90 capsule 1 1/1/2025    Cholecalciferol (VITAMIN D3) 1.25 MG (88070 UT) Oral Cap Take 50,000 Int'l Units/day by mouth once a week.   12/26/2024    rosuvastatin 20 MG Oral Tab Take 1 tablet (20 mg total) by mouth nightly.   1/1/2025    Levocetirizine Dihydrochloride (XYZAL) 5 MG Oral Tab Take 1 tablet (5 mg total) by mouth nightly. 30 tablet 2 1/1/2025    ibuprofen 600 MG Oral Tab Take 1 tablet (600 mg total) by mouth every 6 hours with food 20 tablet 0     LORazepam 1 MG Oral Tab Take 1 tablet (1 mg total) by mouth 2 (two) times daily as needed.       ondansetron 4 MG Oral Tablet Dispersible Take 1 tablet (4 mg total) by mouth every 8 (eight) hours as needed for Nausea. 30 tablet 1     hydrOXYzine Pamoate 25 MG Oral Cap TAKE 1 TO 2 CAPSULES BY MOUTH FOUR TIMES DAILY UNTIL FUTHER NOTICE AS NEEDED (Patient not taking: Reported on 10/1/2024)      [2]   Current  Facility-Administered Medications Ordered in Epic   Medication Dose Route Frequency Provider Last Rate Last Admin    lactated ringers infusion   Intravenous Continuous Ryan Wilkins MD         No current [facility]-ordered outpatient medications on file.   [3]   Allergies  Allergen Reactions    Amoxicillin HIVES and RASH     Other reaction(s): hives    Silver UNKNOWN    Adhesive Tape RASH     Other reaction(s): Rash    Other RASH     tegaderm    Tegaderm Hydrogel Wound Filler [Amerigel Wound Dressing] RASH

## 2025-01-02 NOTE — DISCHARGE INSTRUCTIONS
Home Care Instructions for Gastroscopy with Sedation    Diet:  - Resume your regular diet as tolerated unless otherwise instructed.  - Start with light meals to minimize bloating.  - Do not drink alcohol today.    Medication:  - If you have questions about resuming your normal medications, please contact your Primary Care Physician.    Activities:  - Take it easy today. Do not return to work today.  - Do not drive today.  - Do not operate any machinery today (including kitchen equipment).    Gastroscopy:  - You may have a sore throat for 2-3 days following the exam. This is normal. Gargling with warm salt water (1/2 tsp salt to 1 glass warm water) or using throat lozenges will help.  - If you experience any sharp pain in your neck, abdomen or chest, vomiting of blood, oral temperature over 100 degrees Fahrenheit, light-headedness or dizziness, or any other problems, contact your doctor.    **If unable to reach your doctor, please go to the [facility] Emergency Room**    - Your referring physician will receive a full report of your examination.  - If you do not hear from your doctor's office within two weeks of your biopsy, please call them for your results.    You may be able to see your laboratory results in [employer] between 4 and 7 business days.  In some cases, your physician may not have viewed the results before they are released to [employer].  If you have questions regarding your results contact the physician who ordered the test/exam by phone or via [employer] by choosing \"Ask a Medical Question.\"

## 2025-01-02 NOTE — H&P
History & Physical Examination    Patient Name: Bakari Avina  MRN: [id number]  CSN: [id number]  YOB: 1995    Diagnosis: Anorexia, nausea, vomiting      Prescriptions Prior to Admission[1]  Current Facility-Administered Medications   Medication Dose Route Frequency    lactated ringers infusion   Intravenous Continuous       Allergies: Allergies[2]    Past Medical History:    Allergic rhinitis    Anxiety    Attention deficit hyperactivity disorder (ADHD)    Blurry vision    Congenital scoliosis    Depression    Disorder of thyroid    Esophageal reflux    Fracture, finger    fx left 4th finger - surgical repair    Hyperlipidemia    Hypothyroidism    Thyroid removed d/t CA 2011    Myopia with astigmatism    OU    PONV (postoperative nausea and vomiting)    nausea    Seizure disorder (HCC)    last seizure March 2023    throid cancer age 16    Thyroid carcinoma (HCC)    thyroidectomy    Thyroid carcinoma (HCC)     Past Surgical History:   Procedure Laterality Date    Hand/finger surgery unlisted      Thyroidectomy  2011     Family History   Problem Relation Age of Onset    Glaucoma Father     Lipids Father         hyperlipidemia    Melanoma Father     Other (hyperlipidemia) Father     Hypertension Mother     Ovarian Cancer Maternal Grandmother     Cancer Maternal Grandmother     Other (alzheimer's disease) Paternal Grandmother     Other (Myocardial infarction) Paternal Grandfather     Heart Disorder Paternal Grandfather         Resulted in death    Depression Sister     Lung Disorder Sister         exercise induced bronchitis    Depression Brother     Anxiety Brother     Cancer Maternal Grandfather      Social History     Tobacco Use    Smoking status: Never    Smokeless tobacco: Current    Tobacco comments:     8-10 pouches per day   Substance Use Topics    Alcohol use: Not Currently     Comment: Last drink 8/01       SYSTEM Check if Review is Normal Check if Physical Exam is Normal If not  normal, please explain:   HEENT [X ] [ X]    NECK  [X ] [ X]    HEART [X ] [ X]    LUNGS [X ] [ X]    ABDOMEN [X ] [ X]    EXTREMITIES [X ] [ X]    OTHER        [ x ] I have discussed the risks and benefits and alternatives with the patient/family.  They understand and agree to proceed with plan of care.  [ x ] I have reviewed the History and Physical done within the last 30 days.  Any changes noted above.    Ryan Wilkins MD  1/2/2025  10:51 AM             [1]   Medications Prior to Admission   Medication Sig Dispense Refill Last Dose/Taking    levothyroxine 150 MCG Oral Tab Take 1 tablet (150 mcg total) by mouth before breakfast.   1/1/2025    VYVANSE 50 MG Oral Cap Take 1 capsule (50 mg total) by mouth daily.   1/1/2025    Omega 3 1000 MG Oral Cap Take 3 capsules by mouth at bedtime.   12/27/2024    levETIRAcetam 500 MG Oral Tab TAKE 1 TABLET BY MOUTH IN THE MORNING AND 2 TABLETS BY MOUTH IN THE EVENING (Patient taking differently: Take 1 tablet (500 mg total) by mouth 2 (two) times daily. TAKE 1 TABLET BY MOUTH IN THE MORNING AND 2 TABLETS BY MOUTH IN THE EVENING) 270 tablet 3 1/1/2025    gabapentin 300 MG Oral Cap Take 1 capsule (300 mg total) by mouth in the morning, at noon, and at bedtime. (Patient taking differently: Take 2 capsules (600 mg total) by mouth in the morning and 2 capsules (600 mg total) before bedtime.) 90 capsule 1 1/1/2025    Cholecalciferol (VITAMIN D3) 1.25 MG (33562 UT) Oral Cap Take 50,000 Int'l Units/day by mouth once a week.   12/26/2024    rosuvastatin 20 MG Oral Tab Take 1 tablet (20 mg total) by mouth nightly.   1/1/2025    Levocetirizine Dihydrochloride (XYZAL) 5 MG Oral Tab Take 1 tablet (5 mg total) by mouth nightly. 30 tablet 2 1/1/2025    ibuprofen 600 MG Oral Tab Take 1 tablet (600 mg total) by mouth every 6 hours with food 20 tablet 0     LORazepam 1 MG Oral Tab Take 1 tablet (1 mg total) by mouth 2 (two) times daily as needed.       ondansetron 4 MG Oral Tablet  Dispersible Take 1 tablet (4 mg total) by mouth every 8 (eight) hours as needed for Nausea. 30 tablet 1     hydrOXYzine Pamoate 25 MG Oral Cap TAKE 1 TO 2 CAPSULES BY MOUTH FOUR TIMES DAILY UNTIL FUTHER NOTICE AS NEEDED (Patient not taking: Reported on 10/1/2024)      [2]   Allergies  Allergen Reactions    Amoxicillin HIVES and RASH     Other reaction(s): hives    Silver UNKNOWN    Adhesive Tape RASH     Other reaction(s): Rash    Other RASH     tegaderm    Tegaderm Hydrogel Wound Filler [Amerigel Wound Dressing] RASH

## 2025-01-02 NOTE — ANESTHESIA POSTPROCEDURE EVALUATION
Patient: Bakari Avina    Procedure Summary       Date: 01/02/25 Room / Location: [facility] ENDOSCOPY 03 / EM ENDOSCOPY    Anesthesia Start: 1057 Anesthesia Stop: 1122    Procedure: ESOPHAGOGASTRODUODENOSCOPY Diagnosis:       Nausea and vomiting, unspecified vomiting type      (normal EGD)    Surgeons: Ryan Wilkins MD Anesthesiologist: Rabia Ramirez MD    Anesthesia Type: MAC ASA Status: 2            Anesthesia Type: MAC    Vitals Value Taken Time   /61 01/02/25 1122   Temp 98 01/02/25 1122   Pulse 71 01/02/25 1122   Resp 16 01/02/25 1122   SpO2 100 01/02/25 1122       [facility] AN Post Evaluation:   Patient Evaluated in PACU  Patient Participation: complete - patient participated  Level of Consciousness: awake and alert  Pain Score: 0  Pain Management: satisfactory to patient  Airway Patency:patent  Yes    Nausea/Vomiting: none  Cardiovascular Status: acceptable and blood pressure returned to baseline  Respiratory Status: acceptable  Postoperative Hydration euvolemic      Rabia Ramirez MD  1/2/2025 11:22 AM

## 2025-04-17 NOTE — TELEPHONE ENCOUNTER
Requested Prescriptions     Pending Prescriptions Disp Refills    ondansetron 4 MG Oral Tablet Dispersible 30 tablet 1     Sig: Take 1 tablet (4 mg total) by mouth every 8 (eight) hours as needed for Nausea.     Last Office Visit: 6/19/2024   Last Medication Refill: 10/21/2024

## 2025-04-18 RX ORDER — ONDANSETRON 4 MG/1
4 TABLET, ORALLY DISINTEGRATING ORAL EVERY 8 HOURS PRN
Qty: 30 TABLET | Refills: 1 | Status: SHIPPED | OUTPATIENT
Start: 2025-04-18

## (undated) DIAGNOSIS — R56.9 SEIZURE (HCC): Primary | ICD-10-CM

## (undated) DEVICE — V2 SPECIMEN COLLECTION MANIFOLD KIT: Brand: NEPTUNE

## (undated) DEVICE — GIJAW SINGLE-USE BIOPSY FORCEPS WITH NEEDLE: Brand: GIJAW

## (undated) DEVICE — KIT ENDO ORCAPOD 160/180/190

## (undated) DEVICE — CONMED SCOPE SAVER BITE BLOCK, 20X27 MM: Brand: SCOPE SAVER

## (undated) DEVICE — KIT CLEAN ENDOKIT 1.1OZ GOWNX2

## (undated) DEVICE — CO2 CANNULA,SSOFT,ADLT,7O2,4CO2,FEMALE: Brand: MEDLINE

## (undated) DEVICE — MEDI-VAC NON-CONDUCTIVE SUCTION TUBING 6MM X 1.8M (6FT.) L: Brand: CARDINAL HEALTH

## (undated) DEVICE — MEDI-VAC NON-CONDUCTIVE SUCTION TUBING: Brand: CARDINAL HEALTH

## (undated) DEVICE — 60 ML SYRINGE REGULAR TIP: Brand: MONOJECT

## (undated) DEVICE — YANKAUER,BULB TIP,W/O VENT,RIGID,STERILE: Brand: MEDLINE

## (undated) NOTE — LETTER
[facility]  Administration of Anesthesia  I, Bakari Avina agree to be cared for by a physician anesthesiologist alone and/or with a nurse anesthetist, who is specially trained to monitor me and give me medicine to put me to sleep or keep me comfortable during my procedure    I understand that my anesthesiologist and/or anesthetist is not an employee or agent of [facility] or [employer]. He or she works for [facility]    As the patient asking for anesthesia services, I agree to:  Allow the anesthesiologist (anesthesia doctor) to give me medicine and do additional procedures as necessary. Some examples are: Starting or using an “IV” to give me medicine, fluids or blood during my procedure, and having a breathing tube placed to help me breathe when I’m asleep (intubation). In the event that my heart stops working properly, I understand that my anesthesiologist will make every effort to sustain my life, unless otherwise directed by [facility] Do Not Resuscitate documents.  Tell my anesthesia doctor before my procedure:  If I am pregnant.  The last time that I ate or drank.  iii. All of the medicines I take (including prescriptions, herbal supplements, and pills I can buy without a prescription (including street drugs/illegal medications). Failure to inform my anesthesiologist about these medicines may increase my risk of anesthetic complications.  iv.If I am allergic to anything or have had a reaction to anesthesia before.  I understand how the anesthesia medicine will help me (benefits).  I understand that with any type of anesthesia medicine there are risks:  The most common risks are: nausea, vomiting, sore throat, muscle soreness, damage to my eyes, mouth, or teeth (from breathing tube placement).  Rare risks include: remembering what happened during my procedure, allergic reactions to medications, injury to my airway, heart, lungs, vision, nerves,  or muscles and in extremely rare instances death.  My doctor has explained to me other choices available to me for my care (alternatives).  Pregnant Patients (“epidural”):  I understand that the risks of having an epidural (medicine given into my back to help control pain during labor), include itching, low blood pressure, difficulty urinating, headache or slowing of the baby’s heart. Very rare risks include infection, bleeding, seizure, irregular heart rhythms and nerve injury.  Regional Anesthesia (“spinal”, “epidural”, & “nerve blocks”):  I understand that rare but potential complications include headache, bleeding, infection, seizure, irregular heart rhythms, and nerve injury.    _____________________________________________________________________________  Patient (or Representative) Signature/Relationship to Patient  Date   Time    _____________________________________________________________________________   Name (if used)    Language/Organization   Time    _____________________________________________________________________________  Nurse Anesthetist Signature     Date   Time  _____________________________________________________________________________  Anesthesiologist Signature     Date   Time  I have discussed the procedure and information above with the patient (or patient’s representative) and answered their questions. The patient or their representative has agreed to have anesthesia services.    _____________________________________________________________________________  Witness        Date   Time  I have verified that the signature is that of the patient or patient’s representative, and that it was signed before the procedure  Patient Name: Bakari Avina     : 1995                 Printed: 2024 at 10:12 AM    Medical Record #: [id number]                                            Page 1 of 1  ----------ANESTHESIA CONSENT----------

## (undated) NOTE — LETTER
[place] [address], [place], [place]    Authorization for Surgical Operation and Procedure                               I hereby authorize Ryan Wilkins MD, my physician and his/her assistants (if applicable), which may include medical students, residents, and/or fellows, to perform the following surgical operation/ procedure and administer such anesthesia as may be determined necessary by my physician: Operation/Procedure name (s) ESOPHAGOGASTRODUODENOSCOPY on Bakari Avina   2.   I recognize that during the surgical operation/procedure, unforeseen conditions may necessitate additional or different procedures than those listed above.  I, therefore, further authorize and request that the above-named surgeon, assistants, or designees perform such procedures as are, in their judgment, necessary and desirable.    3.   My surgeon/physician has discussed prior to my surgery the potential benefits, risks and side effects of this procedure; the likelihood of achieving goals; and potential problems that might occur during recuperation.  They also discussed reasonable alternatives to the procedure, including risks, benefits, and side effects related to the alternatives and risks related to not receiving this procedure.  I have had all my questions answered and I acknowledge that no guarantee has been made as to the result that may be obtained.    4.   Should the need arise during my operation/procedure, which includes change of level of care prior to discharge, I also consent to the administration of blood and/or blood products.  Further, I understand that despite careful testing and screening of blood or blood products by collecting agencies, I may still be subject to ill effects as a result of receiving a blood transfusion and/or blood products.  The following are some, but not all, of the potential risks that can occur: fever and allergic reactions, hemolytic reactions,  transmission of diseases such as Hepatitis, AIDS and Cytomegalovirus (CMV) and fluid overload.  In the event that I wish to have an autologous transfusion of my own blood, or a directed donor transfusion, I will discuss this with my physician.  Check only if Refusing Blood or Blood Products  I understand refusal of blood or blood products as deemed necessary by my physician may have serious consequences to my condition to include possible death. I hereby assume responsibility for my refusal and release the hospital, its personnel, and my physicians from any responsibility for the consequences of my refusal.    o  Refuse   5.   I authorize the use of any specimen, organs, tissues, body parts or foreign objects that may be removed from my body during the operation/procedure for diagnosis, research or teaching purposes and their subsequent disposal by hospital authorities.  I also authorize the release of specimen test results and/or written reports to my treating physician on the hospital medical staff or other referring or consulting physicians involved in my care, at the discretion of the Pathologist or my treating physician.    6.   I consent to the photographing or videotaping of the operations or procedures to be performed, including appropriate portions of my body for medical, scientific, or educational purposes, provided my identity is not revealed by the pictures or by descriptive texts accompanying them.  If the procedure has been photographed/videotaped, the surgeon will obtain the original picture, image, videotape or CD.  The hospital will not be responsible for storage, release or maintenance of the picture, image, tape or CD.    7.   I consent to the presence of a  or observers in the operating room as deemed necessary by my physician or their designees.    8.   I recognize that in the event my procedure results in extended X-Ray/fluoroscopy time, I may develop a skin reaction.    9. If  I have a Do Not Attempt Resuscitation (DNAR) order in place, that status will be suspended while in the operating room, procedural suite, and during the recovery period unless otherwise explicitly stated by me (or a person authorized to consent on my behalf). The surgeon or my attending physician will determine when the applicable recovery period ends for purposes of reinstating the DNAR order.  10. Patients having a sterilization procedure: I understand that if the procedure is successful the results will be permanent and it will therefore be impossible for me to inseminate, conceive, or bear children.  I also understand that the procedure is intended to result in sterility, although the result has not been guaranteed.   11. I acknowledge that my physician has explained sedation/analgesia administration to me including the risk and benefits I consent to the administration of sedation/analgesia as may be necessary or desirable in the judgment of my physician.    I CERTIFY THAT I HAVE READ AND FULLY UNDERSTAND THE ABOVE CONSENT TO OPERATION and/or OTHER PROCEDURE.     ____________________________________  _________________________________        ______________________________  Signature of Patient    Signature of Responsible Person                Printed Name of Responsible Person                                      ____________________________________  _____________________________                ________________________________  Signature of Witness        Date  Time         Relationship to Patient    STATEMENT OF PHYSICIAN My signature below affirms that prior to the time of the procedure; I have explained to the patient and/or his/her legal representative, the risks and benefits involved in the proposed treatment and any reasonable alternative to the proposed treatment. I have also explained the risks and benefits involved in refusal of the proposed treatment and alternatives to the proposed treatment and have  answered the patient's questions. If I have a significant financial interest in a co-management agreement or a significant financial interest in any product or implant, or other significant relationship used in this procedure/surgery, I have disclosed this and had a discussion with my patient.     _____________________________________________________              _____________________________  (Signature of Physician)                                                                                         (Date)                                   (Time)  Patient Name: Bakari Arnoldcyrus Avina      : 1995      Printed: 2024     Medical Record #: [id number]                                      Page 1 of 1

## (undated) NOTE — MR AVS SNAPSHOT
[facility] [place] [address] [address]  [phone]               Thank you for choosing us for your health care visit with Bekah Leavitt MD.  We are glad to serve you and happy to provide you with this summary o Current Medications          This list is accurate as of: 5/17/17 10:19 AM.  Always use your most recent med list.                Adapalene-Benzoyl Peroxide 0.1-2.5 % Gel   Apply 1 Application topically nightly.  Use as tolerated- may bleach clothing   Comm

## (undated) NOTE — LETTER
Hospital Discharge Documentation  Please phone to schedule a hospital follow up appointment. No discharge summary available at this time, below is the most recent progress note  for your review .       From: [address] [facility]  Phone: [phone] drinks per day. He also takes Xanax intermittently for anxiety. Patient denies any recent increase in use of either and no recent sudden stopping of alcohol or Xanax.   No prior history of alcohol withdrawal.  Patient does report using THC substance that daily.     LEVOCETIRIZINE DIHYDROCHLORIDE (XYZAL) 5 MG ORAL TAB    Take 1 tablet (5 mg total) by mouth nightly.     LEVOTHYROXINE SODIUM (SYNTHROID) 150 MCG ORAL TAB    Take 150 mcg by mouth before breakfast.     LEVOTHYROXINE SODIUM 112 MCG ORAL TAB    Ta stated above     PHYSICAL EXAM  Vital signs:  /80   Pulse 87   Temp 98.2 °F (36.8 °C) (Oral)   Resp 19   SpO2 97%      GENERAL:  Awake and alert, in no acute distress. HEENT: Normocephalic. Extraocular muscles were intact. PERRL.   Bruising and exc ASSESSMENT/PLAN       Seizure ([facility] [address]  -Unclear etiology  -CT brain without acute abnormalities as above  -Less likely encephalitis as patient remains afebrile, without nuchal rigidity or eye pain, and no known recent sick contacts or insect bites  -Some pos